# Patient Record
Sex: FEMALE | Race: BLACK OR AFRICAN AMERICAN | NOT HISPANIC OR LATINO | Employment: OTHER | URBAN - METROPOLITAN AREA
[De-identification: names, ages, dates, MRNs, and addresses within clinical notes are randomized per-mention and may not be internally consistent; named-entity substitution may affect disease eponyms.]

---

## 2017-04-05 ENCOUNTER — HOSPITAL ENCOUNTER (EMERGENCY)
Facility: OTHER | Age: 57
Discharge: HOME | End: 2017-04-05
Attending: EMERGENCY MEDICINE
Payer: MEDICAID

## 2017-04-05 VITALS
BODY MASS INDEX: 44.16 KG/M2 | RESPIRATION RATE: 18 BRPM | TEMPERATURE: 98 F | HEIGHT: 62 IN | OXYGEN SATURATION: 97 % | DIASTOLIC BLOOD PRESSURE: 79 MMHG | WEIGHT: 240 LBS | SYSTOLIC BLOOD PRESSURE: 131 MMHG | HEART RATE: 80 BPM

## 2017-04-05 DIAGNOSIS — H66.001 ACUTE SUPPURATIVE OTITIS MEDIA OF RIGHT EAR WITHOUT SPONTANEOUS RUPTURE OF TYMPANIC MEMBRANE, RECURRENCE NOT SPECIFIED: Primary | ICD-10-CM

## 2017-04-05 PROCEDURE — 99283 EMERGENCY DEPT VISIT LOW MDM: CPT

## 2017-04-05 RX ORDER — AMOXICILLIN AND CLAVULANATE POTASSIUM 875; 125 MG/1; MG/1
1 TABLET, FILM COATED ORAL 2 TIMES DAILY
Qty: 20 TABLET | Refills: 0 | Status: SHIPPED | OUTPATIENT
Start: 2017-04-05 | End: 2017-04-15

## 2017-04-05 RX ORDER — OXYCODONE AND ACETAMINOPHEN 5; 325 MG/1; MG/1
1 TABLET ORAL
Status: DISCONTINUED | OUTPATIENT
Start: 2017-04-05 | End: 2017-04-05

## 2017-04-05 RX ORDER — NAPROXEN 500 MG/1
500 TABLET ORAL 2 TIMES DAILY WITH MEALS
Qty: 20 TABLET | Refills: 0 | Status: SHIPPED | OUTPATIENT
Start: 2017-04-05 | End: 2021-11-21

## 2017-04-05 NOTE — DISCHARGE INSTRUCTIONS
Understanding Middle Ear Infections in Children  Middle ear infections are most common in children under age 5. Crankiness, a fever, and tugging at or rubbing the ear may all be signs that your child has a middle ear infection. This is especially true if your child has a cold or other viral illness. It's important to call your healthcare provider if you see these or any of the signs listed below.  Call your healthcare provider's office if you notice any signs of a middle ear infection.   What are middle ear infections?    Middle ear infections occur behind the eardrum. The eardrum is the thin sheet of tissue that passes sound waves between the outer and middle ear. These infections are usually caused by bacteria or viruses. These are often related to a recent cold or allergy problem.  A blocked tube  In young children, these bacteria or viruses likely reach the middle ear by traveling the short length of the eustachian tube from the back of the nose. Once in the middle ear, they multiply and spread. This irritates delicate tissues lining the middle ear and eustachian tube. If the tube lining swells enough to block off the tube, air pressure drops in the middle ear. This pulls the eardrum inward, making it stiffer and less able to transmit sound.  Fluid buildup causes pain  Once the eustachian tube swells shut, moisture cant drain from the middle ear. Fluid that should flush out the infection builds up in the chamber. This may raise pressure behind the eardrum. This can decrease pain slightly. But if the infection spreads to this fluid, pressure behind the eardrum goes way up. The eardrum is forced outward. It becomes painful, and may break.  Chronic fluid affects hearing  If the eardrum doesnt break and the tube remains blocked, the fluid becomes an ongoing condition (chronic). As the immediate (acute) infection passes, the middle ear fluid thickens. It becomes sticky and takes up less space. Pressure drops in  the middle ear once more. Inward suction stiffens the eardrum. This affects hearing. If the fluid is not removed, the eardrum may be stretched and damaged.  Signs of middle ear problems  · A temperature over 100.4°F (38.0°C) and cold symptoms  · Severe ear pain  · Any kind of discharge from the ear  · Ear pain that gets worse or doesnt go away after a few days   When to call your child's healthcare provider  Call your child's healthcare provider's office if your otherwise healthy child has any of the signs or symptoms described below:  · In an infant under 3 months old, a rectal temperature of 100.4°F (38.0°C) or higher  · In a child of any age who has a repeated temperature of 104°F (40°C) or higher  · A fever that lasts more than 24 hours in a child under 2 years old, or for 3 days in a child 2 years or older  · Your child has had a seizure caused by the fever  · Rapid breathing or shortness of breath  · A stiff neck or headache  · Difficulty swallowing  · Your child acts ill after the fever is gone  · Persistent brown, green, or bloody mucus  · Signs of dehydration. These include severe thirst, dark yellow urine, infrequent urination, dull or sunken eyes, dry skin, and dry or cracked lips.  · Your child still doesn't look or act right to you, even after taking a non-aspirin pain reliever  Date Last Reviewed: 11/1/2016  © 0621-2162 [employer]. [address] [place] [address], [place] [address]. All rights reserved. This information is not intended as a substitute for professional medical care. Always follow your healthcare professional's instructions.          Reducing the Risk of Middle Ear Infections     Good handwashing can help your child prevent ear infections.   Most children have had at least one middle ear infection by the age of 2. Treatment may depend on whether the problem is acute or chronic, as well as how often it comes back and how long it lasts.   Reducing risk factors  Some behaviors or  surroundings increase your childs risk of ear infection. Reducing such risk factors can be helpful at any point in treatment. The tips below may help.  · If your child goes to group , he or she runs a greater risk of getting colds or flu, which may then lead to an ear infection. Help prevent these illnesses by teaching your child to wash his or her hands often.  · If your child has nasal allergies, do your best to control dust, mold, mildew, and pet hair in the house. Also stop or greatly limit your childs contact with secondhand smoke.  · If food allergies are a problem, identify the food that triggers the reaction and help your child avoid it.  Watching and waiting  · If your child is diagnosed with an ear infection, the doctor may prescribe antibiotics and will suggest a period of watchful waiting. This means not filling any prescriptions right away. Instead of antibiotics, a trial of medications to relieve symptoms including those for pain or fever, is advised, along with waiting some time to see if a child improves without antibiotic therapy. Whether or not your doctor prescribes immediate antibiotics or a period of watchful waiting depends on your child's age and risk factors.  · During this time, your child should be monitored to see if his or her symptoms are improving and to make sure new symptoms, such as fever or vomiting, don't develop. If a child doesn't improve within a few days or develops new symptoms, antibiotics will usually be started.    Date Last Reviewed: 9/3/2014  © 5000-9934 [employer]. [address] [place] [address], [place] [address]. All rights reserved. This information is not intended as a substitute for professional medical care. Always follow your healthcare professional's instructions.          When to Use Antibiotics   Antibiotics are medicines used to treat infections caused by bacteria. They dont work for illnesses caused by viruses or an allergic reaction. In  fact, taking antibiotics for reasons other than a bacterial infection can cause problems. For example, you may have side effects from the medicine. And if you really need an antibiotic, it may not work well.                                                                                                                                              When antibiotics wont help  Your healthcare provider wont usually prescribe antibiotics for the following conditions. You can help by not asking for them if you have:   · A cold. This type of illness is caused by a virus. It can cause a runny nose, stuffed-up nose, sneezing, coughing, headache, mild body aches, and low fever. A cold gets better on its own in a few days to a week.  · The flu (influenza). This is a respiratory illness caused by a virus. The flu usually goes away on its own in a week or so. It can cause fever, body aches, sore throat, and fatigue.  · Bronchitis. This is an infection in the lungs most often caused by a virus. You may have coughing, phlegm, body aches, and a low fever. A common type of bronchitis is known as a chest cold (acute bronchitis). This often happens after you have a respiratory infection like a common cold. Bronchitis can take weeks to go away, but antibiotics usually dont help.  · Most sore throats. Sore throats are most often caused by viruses. Your throat may feel scratchy or achy, and it may hurt to swallow. You may also have a low fever and body aches. A sore throat usually gets better in a few days.  · Most ear infections. An ear infection may be caused by a virus or bacteria. It causes pain in the ear. Antibiotics usually dont help, and the infection goes away on its own.  · Most sinus infections (sinusitis). This kind of infection causes sinus pain and swelling, and a runny nose. In most cases, sinusitis goes away on its own, and antibiotics dont make recovery quicker.  · Allergic rhinitis. This is a set of symptoms caused  by an allergic reaction. You may have sneezing, a runny nose, itchy or watery eyes, or a sore throat. Allergies are not treated with antibiotics.  · Low fever. A mild fever thats less than 100.4°F (38°C) most likely doesnt need treatment with antibiotics.   When antibiotics can help   Antibiotics can be used to treat:                                                     · Strep throat. This is a throat infectioncaused by a certain type of bacteria. Symptoms of strep throat include a sore throat, white patches on the tonsils, red spots on the roof of the mouth, fever, body aches, and nausea and vomiting.  · Urinary tract infection (UTI). This is a bacterial infection of the bladder and the tube that takes urine out of the body. It can cause burning pain and urine thats cloudy or tinted with blood. UTIs are very common. Antibiotics usually help treat these infections.  · Some ear infections. In some cases, a healthcare provider may prescribe antibiotics for an ear infection. You may need a test to show whats causing the ear infection.  · Some sinus infections. In some cases, yourhealthcare provider may give you antibiotics. He or she may first need to make sure your symptoms arent caused by a virus, fungus, allergies, or air pollutants such as smoke.   Your doctor may also recommend antibiotics if you have a condition that can affect your immune system, such as diabetes or cancer.   Self-care at home   If your infection cant be treated with antibiotics, you can take other steps to feel better. Try the remedies below. In general:   · Rest and sleep as much as needed.  · Drink water and other clear fluids.  · Dont smoke, and avoid smoke from other people.  · Use over-the-counter medicine such as acetaminophen to ease pain or fever, as directed by your healthcare provider.   To treat sinus pain or nasal congestion:   · Put a warm, moist washcloth on your face where you feel sinus pain or pressure.  · Use a nasal  spray with medicine or saline, as directed by your healthcare provider.  · Breathe in steam from a hot shower.  · Use a humidifier or cool mist vaporizer.   To quiet a cough:   · Use a humidifier or cool mist vaporizer.  · Breathe in steam from a hot shower.  · Use cough lozenges.   To sooth a sore throat:   · Suck on ice chips, popsicles, or lozenges.  · Use a sore throat spray.  · Use a humidifier or cool mist vaporizer.  · Gargle with saltwater.  · Drink warm liquids.   To ease ear pain:   · Hold a warm, moist washcloth on the ear for 10 minutes at a time.  Date Last Reviewed: 9/1/2016  © 8913-0034 [employer]. [address] [place] [address], [place] [address]. All rights reserved. This information is not intended as a substitute for professional medical care. Always follow your healthcare professional's instructions.

## 2017-04-05 NOTE — ED PROVIDER NOTES
Encounter Date: 4/5/2017       History     Chief Complaint   Patient presents with    Ear Fullness     Pt here with c/o pain/fullness to right ear     Review of patient's allergies indicates:   Allergen Reactions    Codeine Itching     HPI Comments: Irasema Valverde is a 56 y.o. female who presents to the Emergency Department with  right ear pain also left ear feels full.  Patient states her symptoms started a few days ago and are getting worse every day.    Patient is a 56 y.o. female presenting with the following complaint: ear pain. The history is provided by the patient.   Otalgia   This is a new problem. The current episode started several days ago. There is pain in the right ear. The problem occurs constantly. The problem has been gradually worsening. Pertinent negatives include no headaches, no rhinorrhea, no sore throat, no abdominal pain, no vomiting, no neck pain, no cough and no rash.     Past Medical History:   Diagnosis Date    Anemia     Arthritis     Asthma     CHF (congestive heart failure)     Ear infection     chronic    Glossopharyngeal neuralgia     High cholesterol     Hypertension     Hypothyroid     Lung disease     Migraine headache     Thyroid disease     Tracheostomy in place     Umbilical hernia     Vertigo      Past Surgical History:   Procedure Laterality Date    HERNIA REPAIR      HYSTERECTOMY      Right chronic otitiis media with mucocle      THYROID SURGERY      TRACHEAL SURGERY       Family History   Problem Relation Age of Onset    Hypertension Mother     Hyperlipidemia Mother     Hypertension Brother     Hyperlipidemia Brother     Diabetes Brother      Social History   Substance Use Topics    Smoking status: Never Smoker    Smokeless tobacco: None    Alcohol use Yes      Comment: occasional     Review of Systems   Constitutional: Negative for chills and fever.   HENT: Positive for ear pain. Negative for rhinorrhea and sore throat.    Respiratory: Negative  for cough and shortness of breath.    Cardiovascular: Negative for chest pain.   Gastrointestinal: Negative for abdominal pain and vomiting.   Musculoskeletal: Negative for neck pain.   Skin: Negative for rash.   Neurological: Negative for headaches.   All other systems reviewed and are negative.      Physical Exam   Initial Vitals   BP Pulse Resp Temp SpO2   04/05/17 1538 04/05/17 1538 04/05/17 1538 04/05/17 1538 04/05/17 1538   131/79 80 18 98.3 °F (36.8 °C) 97 %     Physical Exam    Nursing note and vitals reviewed.  Constitutional: She appears well-developed and well-nourished. No distress.   HENT:   Head: Normocephalic and atraumatic.   Right Ear: External ear normal. No swelling. No mastoid tenderness. Tympanic membrane is erythematous.   Left Ear: Tympanic membrane and external ear normal. No swelling. No mastoid tenderness.   Nose: Nose normal.   Mouth/Throat: Oropharynx is clear and moist.   Right ear minimal amount of earwax tympanic membrane with erythema.  No discharge per sheeted.  Left ear moderate amount of earwax tympanic membrane normal.   Eyes: EOM are normal. Pupils are equal, round, and reactive to light. No scleral icterus.   Neck: Normal range of motion. Neck supple.   Cardiovascular: Normal rate, regular rhythm, normal heart sounds and intact distal pulses.   Pulmonary/Chest: Breath sounds normal. No respiratory distress. She has no wheezes. She has no rales.   Abdominal: Soft. Bowel sounds are normal. She exhibits no distension. There is no tenderness. There is no rebound.   Musculoskeletal: Normal range of motion. She exhibits no edema or tenderness.   Neurological: She is alert and oriented to person, place, and time. She has normal strength.   Skin: Skin is warm and dry.         ED Course   Procedures  Labs Reviewed - No data to display                            ED Course     CC ear pain and fullness  DDx viral illness, otitis media, otitis externa, and cerumen impacted  Treatment in the  ED physical exam, offered patient Percocet for pain but she declined it.  Patient reports she feels that she has a lot of earwax.  Minimal air wax the right ear moderate amount of earwax in left ear.  Discussed outpatient to plan and need to follow-up with primary care.    Fill and take prescriptions for Augmentin, naproxen, and Debrox as directed.  Answered questions and discussed discharge plan.    Follow up with PCP in 1-2 days.    Clinical Impression:   The encounter diagnosis was Acute suppurative otitis media of right ear without spontaneous rupture of tympanic membrane, recurrence not specified.          Lianna Brown DO  04/05/17 1485

## 2017-04-05 NOTE — ED AVS SNAPSHOT
[facility] EMERGENCY DEPARTMENT  [address] [facility] [place] [address]               Irasema Valverde   2017  3:56 PM   ED    Description:  Female : 1960   Department:  [facility] Emergency Department           Your Care was Coordinated By:     Provider Role From To    Lianna Brown DO Attending Provider [id number] --      Reason for Visit     Ear Fullness           Diagnoses this Visit        Comments    Acute suppurative otitis media of right ear without spontaneous rupture of tympanic membrane, recurrence not specified    -  Primary       ED Disposition     ED Disposition Condition Comment    Discharge             To Do List           Follow-up Information     Follow up with Corry Rao MD. Schedule an appointment as soon as possible for a visit in 1 day.    Specialty:  Internal Medicine    Contact information:    [address]  [place] [address]  [phone]          Follow up with [facility] Emergency Department.    Specialty:  Emergency Medicine    Why:  If symptoms worsen    Contact information:    [address] [facility] [address]  [phone]       These Medications        Disp Refills Start End    amoxicillin-clavulanate 875-125mg (AUGMENTIN) 875-125 mg per tablet 20 tablet 0 2017 4/15/2017    Take 1 tablet by mouth 2 (two) times daily. - Oral    Pharmacy: [facility] [address], [facility] Ph #: [phone]       naproxen (NAPROSYN) 500 MG tablet 20 tablet 0 2017     Take 1 tablet (500 mg total) by mouth 2 (two) times daily with meals. - Oral    Pharmacy: [address], [facility] Ph #: [phone]       carbamide peroxide (DEBROX) 6.5 % otic solution 15 mL 0 2017     Place 5 drops into both ears as needed. - Both Ears    Pharmacy: [address], [facility] Ph #: [phone]         [facility]     [facility]  Nurse Care Line - 24/7 Assistance  Unless otherwise directed by your provider, please contact [facility], our nurse care line that is available for 24/7 assistance.     Registered nurses in the [facility] provide: appointment scheduling, clinical advisement, health education, and other advisory services.  Call: [phone] (toll free)               Medications           Message regarding Medications     Verify the changes and/or additions to your medication regime listed below are the same as discussed with your clinician today.  If any of these changes or additions are incorrect, please notify your healthcare provider.        START taking these NEW medications        Refills    amoxicillin-clavulanate 875-125mg (AUGMENTIN) 875-125 mg per tablet 0    Sig: Take 1 tablet by mouth 2 (two) times daily.    Class: Print    Route: Oral    naproxen (NAPROSYN) 500 MG tablet 0    Sig: Take 1 tablet (500 mg total) by mouth 2 (two) times daily with meals.    Class: Print    Route: Oral    carbamide peroxide (DEBROX) 6.5 % otic solution 0    Sig: Place 5 drops into both ears as needed.    Class: Print    Route: Both Ears      These medications were administered today        Dose Freq    oxycodone-acetaminophen 5-325 mg per tablet 1 tablet 1 tablet ED 1 Time    Sig: Take 1 tablet by mouth ED 1 Time.    Class: Normal    Route: Oral           Verify that the below list of medications is an accurate representation of the medications you are currently taking.  If none reported, the list may be blank. If incorrect, please contact your healthcare provider. Carry this list with you in case of emergency.           Current Medications     acetaminophen (TYLENOL) 325 MG tablet Take 2 tablets (650 mg total) by mouth every 4 to 6 hours as needed for Pain.    albuterol (PROVENTIL) 2.5 mg /3 mL (0.083 %) nebulizer solution Take 2.5 mg by nebulization every 4 (four) hours as needed.      amoxicillin-clavulanate 875-125mg  "(AUGMENTIN) 875-125 mg per tablet Take 1 tablet by mouth 2 (two) times daily.    atorvastatin (LIPITOR) 80 MG tablet Take 80 mg by mouth once daily.      carbamide peroxide (DEBROX) 6.5 % otic solution Place 5 drops into both ears as needed.    carvedilol (COREG) 3.125 MG tablet Take 3.125 mg by mouth 2 (two) times daily with meals.    diazepam (VALIUM) 5 MG tablet Take 5 mg by mouth every 6 (six) hours as needed.    dicyclomine (BENTYL) 20 mg tablet Take 1 tablet (20 mg total) by mouth every 6 (six) hours as needed (every 6 hours as needed for pain).    docusate sodium (COLACE) 100 MG capsule Take 100 mg by mouth 2 (two) times daily.      fluticasone-salmeterol 250-50 mcg/dose (ADVAIR DISKUS) 250-50 mcg/dose diskus inhaler Inhale 1 puff into the lungs 2 (two) times daily.      furosemide (LASIX) 80 MG tablet Take 80 mg by mouth once daily.    levothyroxine (SYNTHROID) 200 MCG tablet Take 200 mcg by mouth once daily.    lisinopril 10 MG tablet Take 10 mg by mouth once daily.    metoprolol succinate (TOPROL-XL) 25 MG 24 hr tablet Take 25 mg by mouth once daily.    naproxen (NAPROSYN) 500 MG tablet Take 1 tablet (500 mg total) by mouth 2 (two) times daily with meals.    neomycin-polymyxin-hydrocortisone (CORTISPORIN) 3.5-10,000-1 mg-unit/mL-% otic suspension Please place 4 drops into the right ear every 6 hours, 4 times a day    oxycodone-acetaminophen 5-325 mg per tablet 1 tablet Take 1 tablet by mouth ED 1 Time.    pantoprazole (PROTONIX) 40 MG tablet Take 1 tablet (40 mg total) by mouth once daily.           Clinical Reference Information           Your Vitals Were     BP Pulse Temp Resp Height Weight    131/79 80 98.3 °F (36.8 °C) (Temporal) 18 5' 2" (1.575 m) 108.9 kg (240 lb)    SpO2 BMI             97% 43.9 kg/m2         Allergies as of 4/5/2017        Reactions    Codeine Itching      Immunizations Administered on Date of Encounter - 4/5/2017     None      ED Micro, Lab, POCT     None      ED Imaging Orders  "    None        Discharge Instructions         Understanding Middle Ear Infections in Children  Middle ear infections are most common in children under age 5. Crankiness, a fever, and tugging at or rubbing the ear may all be signs that your child has a middle ear infection. This is especially true if your child has a cold or other viral illness. It's important to call your healthcare provider if you see these or any of the signs listed below.  Call your healthcare provider's office if you notice any signs of a middle ear infection.   What are middle ear infections?    Middle ear infections occur behind the eardrum. The eardrum is the thin sheet of tissue that passes sound waves between the outer and middle ear. These infections are usually caused by bacteria or viruses. These are often related to a recent cold or allergy problem.  A blocked tube  In young children, these bacteria or viruses likely reach the middle ear by traveling the short length of the eustachian tube from the back of the nose. Once in the middle ear, they multiply and spread. This irritates delicate tissues lining the middle ear and eustachian tube. If the tube lining swells enough to block off the tube, air pressure drops in the middle ear. This pulls the eardrum inward, making it stiffer and less able to transmit sound.  Fluid buildup causes pain  Once the eustachian tube swells shut, moisture cant drain from the middle ear. Fluid that should flush out the infection builds up in the chamber. This may raise pressure behind the eardrum. This can decrease pain slightly. But if the infection spreads to this fluid, pressure behind the eardrum goes way up. The eardrum is forced outward. It becomes painful, and may break.  Chronic fluid affects hearing  If the eardrum doesnt break and the tube remains blocked, the fluid becomes an ongoing condition (chronic). As the immediate (acute) infection passes, the middle ear fluid thickens. It becomes sticky  and takes up less space. Pressure drops in the middle ear once more. Inward suction stiffens the eardrum. This affects hearing. If the fluid is not removed, the eardrum may be stretched and damaged.  Signs of middle ear problems  · A temperature over 100.4°F (38.0°C) and cold symptoms  · Severe ear pain  · Any kind of discharge from the ear  · Ear pain that gets worse or doesnt go away after a few days   When to call your child's healthcare provider  Call your child's healthcare provider's office if your otherwise healthy child has any of the signs or symptoms described below:  · In an infant under 3 months old, a rectal temperature of 100.4°F (38.0°C) or higher  · In a child of any age who has a repeated temperature of 104°F (40°C) or higher  · A fever that lasts more than 24 hours in a child under 2 years old, or for 3 days in a child 2 years or older  · Your child has had a seizure caused by the fever  · Rapid breathing or shortness of breath  · A stiff neck or headache  · Difficulty swallowing  · Your child acts ill after the fever is gone  · Persistent brown, green, or bloody mucus  · Signs of dehydration. These include severe thirst, dark yellow urine, infrequent urination, dull or sunken eyes, dry skin, and dry or cracked lips.  · Your child still doesn't look or act right to you, even after taking a non-aspirin pain reliever  Date Last Reviewed: 11/1/2016  © 8633-2273 [employer]. [address] [place] [address], [place] [address]. All rights reserved. This information is not intended as a substitute for professional medical care. Always follow your healthcare professional's instructions.          Reducing the Risk of Middle Ear Infections     Good handwashing can help your child prevent ear infections.   Most children have had at least one middle ear infection by the age of 2. Treatment may depend on whether the problem is acute or chronic, as well as how often it comes back and how long it lasts.    Reducing risk factors  Some behaviors or surroundings increase your childs risk of ear infection. Reducing such risk factors can be helpful at any point in treatment. The tips below may help.  · If your child goes to group , he or she runs a greater risk of getting colds or flu, which may then lead to an ear infection. Help prevent these illnesses by teaching your child to wash his or her hands often.  · If your child has nasal allergies, do your best to control dust, mold, mildew, and pet hair in the house. Also stop or greatly limit your childs contact with secondhand smoke.  · If food allergies are a problem, identify the food that triggers the reaction and help your child avoid it.  Watching and waiting  · If your child is diagnosed with an ear infection, the doctor may prescribe antibiotics and will suggest a period of watchful waiting. This means not filling any prescriptions right away. Instead of antibiotics, a trial of medications to relieve symptoms including those for pain or fever, is advised, along with waiting some time to see if a child improves without antibiotic therapy. Whether or not your doctor prescribes immediate antibiotics or a period of watchful waiting depends on your child's age and risk factors.  · During this time, your child should be monitored to see if his or her symptoms are improving and to make sure new symptoms, such as fever or vomiting, don't develop. If a child doesn't improve within a few days or develops new symptoms, antibiotics will usually be started.    Date Last Reviewed: 9/3/2014  © 8069-8890 [employer]. [address] [place] [address], [place] [address]. All rights reserved. This information is not intended as a substitute for professional medical care. Always follow your healthcare professional's instructions.          When to Use Antibiotics   Antibiotics are medicines used to treat infections caused by bacteria. They dont work for illnesses  caused by viruses or an allergic reaction. In fact, taking antibiotics for reasons other than a bacterial infection can cause problems. For example, you may have side effects from the medicine. And if you really need an antibiotic, it may not work well.                                                                                                                                              When antibiotics wont help  Your healthcare provider wont usually prescribe antibiotics for the following conditions. You can help by not asking for them if you have:   · A cold. This type of illness is caused by a virus. It can cause a runny nose, stuffed-up nose, sneezing, coughing, headache, mild body aches, and low fever. A cold gets better on its own in a few days to a week.  · The flu (influenza). This is a respiratory illness caused by a virus. The flu usually goes away on its own in a week or so. It can cause fever, body aches, sore throat, and fatigue.  · Bronchitis. This is an infection in the lungs most often caused by a virus. You may have coughing, phlegm, body aches, and a low fever. A common type of bronchitis is known as a chest cold (acute bronchitis). This often happens after you have a respiratory infection like a common cold. Bronchitis can take weeks to go away, but antibiotics usually dont help.  · Most sore throats. Sore throats are most often caused by viruses. Your throat may feel scratchy or achy, and it may hurt to swallow. You may also have a low fever and body aches. A sore throat usually gets better in a few days.  · Most ear infections. An ear infection may be caused by a virus or bacteria. It causes pain in the ear. Antibiotics usually dont help, and the infection goes away on its own.  · Most sinus infections (sinusitis). This kind of infection causes sinus pain and swelling, and a runny nose. In most cases, sinusitis goes away on its own, and antibiotics dont make recovery  quicker.  · Allergic rhinitis. This is a set of symptoms caused by an allergic reaction. You may have sneezing, a runny nose, itchy or watery eyes, or a sore throat. Allergies are not treated with antibiotics.  · Low fever. A mild fever thats less than 100.4°F (38°C) most likely doesnt need treatment with antibiotics.   When antibiotics can help   Antibiotics can be used to treat:                                                     · Strep throat. This is a throat infectioncaused by a certain type of bacteria. Symptoms of strep throat include a sore throat, white patches on the tonsils, red spots on the roof of the mouth, fever, body aches, and nausea and vomiting.  · Urinary tract infection (UTI). This is a bacterial infection of the bladder and the tube that takes urine out of the body. It can cause burning pain and urine thats cloudy or tinted with blood. UTIs are very common. Antibiotics usually help treat these infections.  · Some ear infections. In some cases, a healthcare provider may prescribe antibiotics for an ear infection. You may need a test to show whats causing the ear infection.  · Some sinus infections. In some cases, yourhealthcare provider may give you antibiotics. He or she may first need to make sure your symptoms arent caused by a virus, fungus, allergies, or air pollutants such as smoke.   Your doctor may also recommend antibiotics if you have a condition that can affect your immune system, such as diabetes or cancer.   Self-care at home   If your infection cant be treated with antibiotics, you can take other steps to feel better. Try the remedies below. In general:   · Rest and sleep as much as needed.  · Drink water and other clear fluids.  · Dont smoke, and avoid smoke from other people.  · Use over-the-counter medicine such as acetaminophen to ease pain or fever, as directed by your healthcare provider.   To treat sinus pain or nasal congestion:   · Put a warm, moist washcloth on  your face where you feel sinus pain or pressure.  · Use a nasal spray with medicine or saline, as directed by your healthcare provider.  · Breathe in steam from a hot shower.  · Use a humidifier or cool mist vaporizer.   To quiet a cough:   · Use a humidifier or cool mist vaporizer.  · Breathe in steam from a hot shower.  · Use cough lozenges.   To sooth a sore throat:   · Suck on ice chips, popsicles, or lozenges.  · Use a sore throat spray.  · Use a humidifier or cool mist vaporizer.  · Gargle with saltwater.  · Drink warm liquids.   To ease ear pain:   · Hold a warm, moist washcloth on the ear for 10 minutes at a time.  Date Last Reviewed: 9/1/2016  © 7010-7102 [employer]. [address] [place] [address]. All rights reserved. This information is not intended as a substitute for professional medical care. Always follow your healthcare professional's instructions.          [facility] Sign-Up     Activating your [facility] account is as easy as 1-2-3!     1) Visit [email or web address], select Sign Up Now, enter this activation code and your date of birth, then select Next.  Activation code not generated  Current Patient Portal Status: Account disabled      2) Create a username and password to use when you visit [facility] in the future and select a security question in case you lose your password and select Next.    3) Enter your e-mail address and click Sign Up!    Additional Information  If you have questions, please e-mail [email or web address] or call [phone] to talk to our [facility] staff. Remember, [facility] is NOT to be used for urgent needs. For medical emergencies, dial 911.          [facility] Emergency Department complies with applicable Federal civil rights laws and does not discriminate on the basis of race, color, national origin, age, disability, or sex.        Language Assistance Services     ATTENTION: Language assistance services are available, free of charge. Please call  [phone].      ATENCIÓN: Si habla español, tiene a taylor disposición servicios gratuitos de asistencia lingüística. Llame al [phone].     CHÚ Ý: N?u b?n nói Ti?ng Vi?t, có các d?ch v? h? tr? ngôn ng? mi?n phí dành cho b?n. G?i s? [phone].

## 2017-04-05 NOTE — ED NOTES
Patient has verified the spelling of their name and  on armband    LOC: The patient is awake, alert, and aware of environment with an appropriate affect, the patient is oriented x 4 and speaking appropriately.     APPEARANCE: Patient resting comfortably and in no acute distress, patient is clean and well groomed, patient's clothing is properly fastened.     RESPIRATORY: Chronic trach patient. Denies any SOB.     CARDIAC:  No chest pain, chest pressure or chest discomfort. No palpitations.     HEENT: Right ear pain. Denies any significant hearing loss.     GASTROINTESTINAL: Soft and non tender to palpation, no distention noted, normoactive bowel sounds present in all four quadrants. No anorexia, nausea, vomiting or diarrhea.     SKIN: The skin is warm and dry, color consistent with ethnicity, patient has normal skin turgor and moist mucus membranes, skin intact, no breakdown or bruising noted.     COMPLAINT: Patient complain of right ear pain x 1 week.

## 2017-09-02 ENCOUNTER — HOSPITAL ENCOUNTER (EMERGENCY)
Facility: HOSPITAL | Age: 57
Discharge: LEFT BEFORE DISCHARGE | End: 2017-09-02
Payer: MEDICAID

## 2017-09-02 VITALS
HEIGHT: 62 IN | DIASTOLIC BLOOD PRESSURE: 81 MMHG | SYSTOLIC BLOOD PRESSURE: 190 MMHG | WEIGHT: 250 LBS | HEART RATE: 97 BPM | TEMPERATURE: 99 F | BODY MASS INDEX: 46.01 KG/M2 | RESPIRATION RATE: 18 BRPM | OXYGEN SATURATION: 98 %

## 2017-09-02 LAB — POCT GLUCOSE: 125 MG/DL (ref 70–110)

## 2017-09-02 PROCEDURE — 99900041 HC LEFT WITHOUT BEING SEEN- EMERGENCY

## 2017-09-02 PROCEDURE — 93005 ELECTROCARDIOGRAM TRACING: CPT

## 2017-09-02 PROCEDURE — 93010 ELECTROCARDIOGRAM REPORT: CPT | Mod: ,,, | Performed by: INTERNAL MEDICINE

## 2017-09-02 PROCEDURE — 82962 GLUCOSE BLOOD TEST: CPT

## 2017-09-30 ENCOUNTER — HOSPITAL ENCOUNTER (EMERGENCY)
Facility: OTHER | Age: 57
Discharge: HOME | End: 2017-09-30
Attending: EMERGENCY MEDICINE
Payer: MEDICAID

## 2017-09-30 VITALS
HEIGHT: 62 IN | TEMPERATURE: 98 F | RESPIRATION RATE: 20 BRPM | OXYGEN SATURATION: 97 % | SYSTOLIC BLOOD PRESSURE: 174 MMHG | WEIGHT: 250 LBS | DIASTOLIC BLOOD PRESSURE: 80 MMHG | BODY MASS INDEX: 46.01 KG/M2 | HEART RATE: 89 BPM

## 2017-09-30 DIAGNOSIS — K42.9 UMBILICAL HERNIA WITHOUT OBSTRUCTION AND WITHOUT GANGRENE: ICD-10-CM

## 2017-09-30 DIAGNOSIS — R52 PAIN: ICD-10-CM

## 2017-09-30 DIAGNOSIS — R31.9 URINARY TRACT INFECTION WITH HEMATURIA, SITE UNSPECIFIED: Primary | ICD-10-CM

## 2017-09-30 DIAGNOSIS — N39.0 URINARY TRACT INFECTION WITH HEMATURIA, SITE UNSPECIFIED: Primary | ICD-10-CM

## 2017-09-30 LAB
ALBUMIN SERPL-MCNC: 3.3 G/DL (ref 3.3–5.5)
ALP SERPL-CCNC: 68 U/L (ref 42–141)
BILIRUB SERPL-MCNC: 0.3 MG/DL (ref 0.2–1.6)
BILIRUBIN, POC UA: NEGATIVE
BLOOD, POC UA: ABNORMAL
BUN SERPL-MCNC: 12 MG/DL (ref 7–22)
CALCIUM SERPL-MCNC: 8.7 MG/DL (ref 8–10.3)
CHLORIDE SERPL-SCNC: 104 MMOL/L (ref 98–108)
CLARITY, POC UA: CLEAR
COLOR, POC UA: YELLOW
CREAT SERPL-MCNC: 1.2 MG/DL (ref 0.6–1.2)
GLUCOSE SERPL-MCNC: 135 MG/DL (ref 73–118)
GLUCOSE, POC UA: NEGATIVE
KETONES, POC UA: NEGATIVE
LEUKOCYTE EST, POC UA: ABNORMAL
NITRITE, POC UA: NEGATIVE
PH UR STRIP: 5.5 [PH]
POC ALT (SGPT): 19 U/L (ref 10–47)
POC AST (SGOT): 20 U/L (ref 11–38)
POC TCO2: 28 MMOL/L (ref 18–33)
POCT GLUCOSE: 159 MG/DL (ref 70–110)
POTASSIUM BLD-SCNC: 4.4 MMOL/L (ref 3.6–5.1)
PROTEIN, POC UA: ABNORMAL
PROTEIN, POC: 7.7 G/DL (ref 6.4–8.1)
SODIUM BLD-SCNC: 149 MMOL/L (ref 128–145)
SPECIFIC GRAVITY, POC UA: 1.02
UROBILINOGEN, POC UA: 1 E.U./DL

## 2017-09-30 PROCEDURE — 99284 EMERGENCY DEPT VISIT MOD MDM: CPT

## 2017-09-30 PROCEDURE — 85025 COMPLETE CBC W/AUTO DIFF WBC: CPT

## 2017-09-30 PROCEDURE — 80053 COMPREHEN METABOLIC PANEL: CPT

## 2017-09-30 PROCEDURE — 81003 URINALYSIS AUTO W/O SCOPE: CPT

## 2017-09-30 RX ORDER — NITROFURANTOIN 25; 75 MG/1; MG/1
100 CAPSULE ORAL 2 TIMES DAILY
Qty: 14 CAPSULE | Refills: 0 | Status: SHIPPED | OUTPATIENT
Start: 2017-09-30 | End: 2017-10-07

## 2017-09-30 NOTE — ED TRIAGE NOTES
Reports left flank pain radiating to LLQ abd x3 days. Denies dysuria/hematuria. Denies trauma/injury. No relief with tylenol.

## 2017-09-30 NOTE — ED PROVIDER NOTES
Encounter Date: 9/30/2017       History     Chief Complaint   Patient presents with    Abdominal Pain    Flank Pain     The history is provided by the patient.   Abdominal Pain   The current episode started several days ago. The onset of the illness was abrupt. Progression since onset: Waxing and waning. The abdominal pain is located in the left flank. The abdominal pain radiates to the back. The severity of the abdominal pain is 8/10. The abdominal pain is relieved by nothing. The other symptoms of the illness do not include fever, fatigue, jaundice, melena, nausea, vomiting, diarrhea, dysuria, hematemesis, hematochezia, vaginal discharge or vaginal bleeding.   The patient states that she believes she is currently not pregnant. The patient has not had a change in bowel habit. Additional symptoms associated with the illness include back pain. Symptoms associated with the illness do not include chills, anorexia, diaphoresis, heartburn, constipation, urgency, hematuria or frequency.     Review of patient's allergies indicates:   Allergen Reactions    Codeine Itching     Past Medical History:   Diagnosis Date    Anemia     Arthritis     Asthma     CHF (congestive heart failure)     Ear infection     chronic    Glossopharyngeal neuralgia     High cholesterol     Hypertension     Hypothyroid     Lung disease     Migraine headache     Thyroid disease     Tracheostomy in place     Umbilical hernia     Vertigo      Past Surgical History:   Procedure Laterality Date    HERNIA REPAIR      HYSTERECTOMY      Right chronic otitiis media with mucocle      THYROID SURGERY      TRACHEAL SURGERY       Family History   Problem Relation Age of Onset    Hypertension Mother     Hyperlipidemia Mother     Hypertension Brother     Hyperlipidemia Brother     Diabetes Brother      Social History   Substance Use Topics    Smoking status: Never Smoker    Smokeless tobacco: Not on file    Alcohol use Yes       Comment: occasional     Review of Systems   Constitutional: Negative.  Negative for chills, diaphoresis, fatigue and fever.   HENT: Negative.    Eyes: Negative.    Respiratory: Negative.    Cardiovascular: Negative.    Gastrointestinal: Positive for abdominal pain. Negative for anorexia, constipation, diarrhea, heartburn, hematemesis, hematochezia, jaundice, melena, nausea and vomiting.   Endocrine: Negative.    Genitourinary: Negative.  Negative for dysuria, frequency, hematuria, urgency, vaginal bleeding and vaginal discharge.   Musculoskeletal: Positive for back pain.   Skin: Negative.    Allergic/Immunologic: Negative.    Neurological: Negative.    Hematological: Negative.    Psychiatric/Behavioral: Negative.    All other systems reviewed and are negative.      Physical Exam     Initial Vitals [09/30/17 1733]   BP Pulse Resp Temp SpO2   (!) 174/80 89 20 98.2 °F (36.8 °C) 97 %      MAP       111.33         Physical Exam    Nursing note and vitals reviewed.  Constitutional: Vital signs are normal. She appears well-developed. She is active and cooperative.   HENT:   Head: Normocephalic and atraumatic.   Eyes: Conjunctivae, EOM and lids are normal. Pupils are equal, round, and reactive to light.   Neck: Trachea normal and full passive range of motion without pain. Neck supple. No thyroid mass present.       Cardiovascular: Normal rate, regular rhythm, S1 normal, S2 normal, normal heart sounds, intact distal pulses and normal pulses.   Abdominal: Soft. Normal appearance, normal aorta and bowel sounds are normal. There is no hepatosplenomegaly. There is no tenderness. There is no rigidity, no rebound, no guarding, no CVA tenderness, no tenderness at McBurney's point and negative Christian's sign.       Musculoskeletal: Normal range of motion.   Lymphadenopathy:     She has no axillary adenopathy.   Neurological: She is alert and oriented to person, place, and time.   Skin: Skin is warm, dry and intact.   Psychiatric: She  has a normal mood and affect. Her speech is normal and behavior is normal. Judgment and thought content normal. Cognition and memory are normal.         ED Course   Procedures  Labs Reviewed   POCT GLUCOSE - Abnormal; Notable for the following:        Result Value    POCT Glucose 159 (*)     All other components within normal limits   POCT URINALYSIS W/O SCOPE   POCT GLUCOSE   POCT CBC   POCT URINALYSIS DIPSTICK (CULTURE IF INDICATED)   POCT CMP              Results for orders placed or performed during the hospital encounter of 09/30/17   POCT URINALYSIS W/O SCOPE   Result Value Ref Range    Glucose, UA Negative (NG)     Bilirubin, UA Negative (NG)     Ketones, UA Negative (NG)     Spec Grav UA 1.025     Blood, UA Trace-intact (A)     PH, UA 5.5     Protein, UA 2+ (A)     Urobilinogen, UA 1.0 E.U./dL    Nitrite, UA Negative (NG)     Leukocytes, UA 1+ (A)     Color, UA Yellow     Clarity, UA Clear    POCT glucose   Result Value Ref Range    POCT Glucose 159 (H) 70 - 110 mg/dL   POCT CMP   Result Value Ref Range    Albumin, POC 3.3 3.3 - 5.5 g/dL    Alkaline Phosphatase, POC 68 42 - 141 U/L    ALT (SGPT), POC 19 10 - 47 U/L    AST (SGOT), POC 20 11 - 38 U/L    POC BUN 12 7 - 22 mg/dL    Calcium, POC 8.7 8.0 - 10.3 mg/dL    POC Chloride 104 98 - 108 mmol/L    POC Creatinine 1.2 0.6 - 1.2 mg/dL    POC Glucose 135 (H) 73 - 118 mg/dL    POC Potassium 4.4 3.6 - 5.1 mmol/L    POC Sodium 149 (H) 128 - 145 mmol/L    Bilirubin 0.3 0.2 - 1.6 mg/dL    POC TCO2 28 18 - 33 mmol/L    Protein 7.7 6.4 - 8.1 g/dL       Medical Decision Making:   ED Management:  The patient has signs and symptoms consistent with renal colic.  In the absence of any kidney stones or about the possibility of urinary tract infection.  I will culture the patient's urine and start her on Macrobid.     Imaging Results          CT Renal Stone Study ABD Pelvis WO (Final result)  Result time 09/30/17 18:18:16    Final result by Lilia August MD (09/30/17  18:18:16)                 Impression:      1.  No acute intra-abdominal findings identified.    2.  Hepatomegaly.    3.  Hysterectomy.    4.  Small fat containing periumbilical hernia with chronic inflammatory changes or scarring in the region, unchanged from 2013.        Electronically signed by: FRAN SALEH MD  Date:     09/30/17  Time:    18:18              Narrative:    Clinical indication: 57-year-old female with abdominal pain.    Comparison: CT abdomen and pelvis 6/2013.    Technique: Transaxial images were obtained through the abdomen and pelvis in 5 mm increments without the use of p.o. or IV contrast.    Findings:  The visualized portion of the heart is unremarkable.  Atelectatic changes are seen the lung bases.    Liver is enlarged measuring 26 cm.  There is no intra-or extrahepatic biliary ductal dilatation.  Gallbladder is contracted.  There is small hiatal hernia.  Stomach is otherwise unremarkable.  Spleen, pancreas, and adrenal glands show no significant.    Kidneys show no evidence of stones or hydronephrosis.  Ureters are unremarkable along their courses.  Urinary bladder is nondistended.  Uterus has been removed.    Appendix is visualized and is unremarkable.  The visualized loops of small and large bowel show no evidence of obstruction or inflammation.  No free air or free fluid.    Aorta tapers normally with moderate atherosclerosis.     Osseous structures are unremarkable.  There is stable small fat containing periumbilical hernia with chronic inflammatory changes or scarring seen within the subcutaneous tissues in this region.  This is unchanged from prior CT from 6/2013.                                             ED Course      Clinical Impression:   The primary encounter diagnosis was Urinary tract infection with hematuria, site unspecified. Diagnoses of Pain and Umbilical hernia without obstruction and without gangrene were also pertinent to this visit.                           Partha  FRED Marks MD  09/30/17 0048

## 2017-12-02 ENCOUNTER — HOSPITAL ENCOUNTER (EMERGENCY)
Facility: OTHER | Age: 57
Discharge: HOME | End: 2017-12-02
Attending: EMERGENCY MEDICINE
Payer: MEDICAID

## 2017-12-02 VITALS
DIASTOLIC BLOOD PRESSURE: 88 MMHG | TEMPERATURE: 99 F | WEIGHT: 250 LBS | HEIGHT: 62 IN | OXYGEN SATURATION: 97 % | BODY MASS INDEX: 46.01 KG/M2 | HEART RATE: 83 BPM | RESPIRATION RATE: 20 BRPM | SYSTOLIC BLOOD PRESSURE: 169 MMHG

## 2017-12-02 DIAGNOSIS — R06.02 SHORTNESS OF BREATH: ICD-10-CM

## 2017-12-02 DIAGNOSIS — I50.9 CONGESTIVE HEART FAILURE, UNSPECIFIED CONGESTIVE HEART FAILURE CHRONICITY, UNSPECIFIED CONGESTIVE HEART FAILURE TYPE: ICD-10-CM

## 2017-12-02 DIAGNOSIS — J45.909 ASTHMA, UNSPECIFIED ASTHMA SEVERITY, UNSPECIFIED WHETHER COMPLICATED, UNSPECIFIED WHETHER PERSISTENT: Primary | ICD-10-CM

## 2017-12-02 DIAGNOSIS — N30.01 ACUTE CYSTITIS WITH HEMATURIA: ICD-10-CM

## 2017-12-02 LAB
ALBUMIN SERPL-MCNC: 3.2 G/DL (ref 3.3–5.5)
ALP SERPL-CCNC: 80 U/L (ref 42–141)
BILIRUB SERPL-MCNC: 0.6 MG/DL (ref 0.2–1.6)
BILIRUBIN, POC UA: NEGATIVE
BLOOD, POC UA: ABNORMAL
BUN SERPL-MCNC: 14 MG/DL (ref 7–22)
CALCIUM SERPL-MCNC: 8.6 MG/DL (ref 8–10.3)
CHLORIDE SERPL-SCNC: 99 MMOL/L (ref 98–108)
CLARITY, POC UA: CLEAR
COLOR, POC UA: YELLOW
CREAT SERPL-MCNC: 0.9 MG/DL (ref 0.6–1.2)
GLUCOSE SERPL-MCNC: 176 MG/DL (ref 73–118)
GLUCOSE, POC UA: NEGATIVE
KETONES, POC UA: NEGATIVE
LEUKOCYTE EST, POC UA: ABNORMAL
NITRITE, POC UA: NEGATIVE
PH UR STRIP: 6 [PH]
POC ALT (SGPT): 22 U/L (ref 10–47)
POC AST (SGOT): 20 U/L (ref 11–38)
POC B-TYPE NATRIURETIC PEPTIDE: 172 PG/ML (ref 0–100)
POC CARDIAC TROPONIN I: 0.03 NG/ML
POC PTINR: 1.1 (ref 0.9–1.2)
POC PTWBT: 12.7 SEC (ref 9.7–14.3)
POC TCO2: 27 MMOL/L (ref 18–33)
POCT GLUCOSE: 206 MG/DL (ref 70–110)
POTASSIUM BLD-SCNC: 4 MMOL/L (ref 3.6–5.1)
PROTEIN, POC UA: ABNORMAL
PROTEIN, POC: 7.6 G/DL (ref 6.4–8.1)
SAMPLE: NORMAL
SAMPLE: NORMAL
SODIUM BLD-SCNC: 142 MMOL/L (ref 128–145)
SPECIFIC GRAVITY, POC UA: 1.02
UROBILINOGEN, POC UA: 1 E.U./DL

## 2017-12-02 PROCEDURE — 85025 COMPLETE CBC W/AUTO DIFF WBC: CPT

## 2017-12-02 PROCEDURE — 87088 URINE BACTERIA CULTURE: CPT

## 2017-12-02 PROCEDURE — 80053 COMPREHEN METABOLIC PANEL: CPT

## 2017-12-02 PROCEDURE — 63600175 PHARM REV CODE 636 W HCPCS: Performed by: EMERGENCY MEDICINE

## 2017-12-02 PROCEDURE — 84484 ASSAY OF TROPONIN QUANT: CPT

## 2017-12-02 PROCEDURE — 83880 ASSAY OF NATRIURETIC PEPTIDE: CPT

## 2017-12-02 PROCEDURE — 96365 THER/PROPH/DIAG IV INF INIT: CPT

## 2017-12-02 PROCEDURE — 25000242 PHARM REV CODE 250 ALT 637 W/ HCPCS: Performed by: EMERGENCY MEDICINE

## 2017-12-02 PROCEDURE — 87086 URINE CULTURE/COLONY COUNT: CPT

## 2017-12-02 PROCEDURE — 94640 AIRWAY INHALATION TREATMENT: CPT

## 2017-12-02 PROCEDURE — 85610 PROTHROMBIN TIME: CPT

## 2017-12-02 PROCEDURE — 99284 EMERGENCY DEPT VISIT MOD MDM: CPT | Mod: 25

## 2017-12-02 PROCEDURE — 87147 CULTURE TYPE IMMUNOLOGIC: CPT

## 2017-12-02 PROCEDURE — 94760 N-INVAS EAR/PLS OXIMETRY 1: CPT

## 2017-12-02 PROCEDURE — 82947 ASSAY GLUCOSE BLOOD QUANT: CPT

## 2017-12-02 PROCEDURE — 81003 URINALYSIS AUTO W/O SCOPE: CPT

## 2017-12-02 PROCEDURE — 96375 TX/PRO/DX INJ NEW DRUG ADDON: CPT

## 2017-12-02 RX ORDER — METOPROLOL TARTRATE 25 MG/1
25 TABLET, FILM COATED ORAL
Status: DISCONTINUED | OUTPATIENT
Start: 2017-12-02 | End: 2017-12-02

## 2017-12-02 RX ORDER — FUROSEMIDE 10 MG/ML
40 INJECTION INTRAMUSCULAR; INTRAVENOUS
Status: DISCONTINUED | OUTPATIENT
Start: 2017-12-02 | End: 2017-12-02

## 2017-12-02 RX ORDER — ASPIRIN 325 MG
325 TABLET ORAL
Status: DISCONTINUED | OUTPATIENT
Start: 2017-12-02 | End: 2017-12-02 | Stop reason: HOSPADM

## 2017-12-02 RX ORDER — FUROSEMIDE 10 MG/ML
40 INJECTION INTRAMUSCULAR; INTRAVENOUS
Status: COMPLETED | OUTPATIENT
Start: 2017-12-02 | End: 2017-12-02

## 2017-12-02 RX ORDER — IPRATROPIUM BROMIDE AND ALBUTEROL SULFATE 2.5; .5 MG/3ML; MG/3ML
3 SOLUTION RESPIRATORY (INHALATION) ONCE
Status: COMPLETED | OUTPATIENT
Start: 2017-12-02 | End: 2017-12-02

## 2017-12-02 RX ORDER — LISINOPRIL 10 MG/1
10 TABLET ORAL
Status: DISCONTINUED | OUTPATIENT
Start: 2017-12-02 | End: 2017-12-02

## 2017-12-02 RX ORDER — ALBUTEROL SULFATE 90 UG/1
2 AEROSOL, METERED RESPIRATORY (INHALATION) EVERY 4 HOURS PRN
Qty: 1 INHALER | Refills: 0 | Status: SHIPPED | OUTPATIENT
Start: 2017-12-02 | End: 2022-07-24 | Stop reason: SDUPTHER

## 2017-12-02 RX ORDER — LEVOFLOXACIN 750 MG/1
750 TABLET ORAL DAILY
Qty: 5 TABLET | Refills: 0 | Status: SHIPPED | OUTPATIENT
Start: 2017-12-02 | End: 2017-12-07

## 2017-12-02 RX ADMIN — FUROSEMIDE 40 MG: 10 INJECTION, SOLUTION INTRAMUSCULAR; INTRAVENOUS at 06:12

## 2017-12-02 RX ADMIN — CEFTRIAXONE SODIUM 1 G: 1 INJECTION, POWDER, FOR SOLUTION INTRAMUSCULAR; INTRAVENOUS at 06:12

## 2017-12-02 RX ADMIN — IPRATROPIUM BROMIDE AND ALBUTEROL SULFATE 3 ML: .5; 3 SOLUTION RESPIRATORY (INHALATION) at 06:12

## 2017-12-03 NOTE — ED PROVIDER NOTES
"Encounter Date: 12/2/2017       History     Chief Complaint   Patient presents with    Chest Pain     Pt states, " I have had chest pain for two days, It hurts when I lay down and when I cough."     Irasema Valverde is a 57 y.o. female who presents to the Emergency Department with  chest pain and shortness of breath for 2 days.  Patient states symptoms are worse when she lays flat.      The history is provided by the patient.   Chest Pain   The current episode started several days ago. Duration of episode(s) is 2 days. Chest pain occurs constantly. The chest pain is unchanged. Associated with: laying flat. The quality of the pain is described as pressure-like. The pain does not radiate. Chest pain is worsened by certain positions. Primary symptoms include shortness of breath and wheezing. Pertinent negatives for primary symptoms include no fever and no nausea.   Pertinent negatives for associated symptoms include no weakness.   Her past medical history is significant for CHF.     Review of patient's allergies indicates:   Allergen Reactions    Codeine Itching     Past Medical History:   Diagnosis Date    Anemia     Arthritis     Asthma     CHF (congestive heart failure)     Ear infection     chronic    Glossopharyngeal neuralgia     High cholesterol     Hypertension     Hypothyroid     Lung disease     Migraine headache     Thyroid disease     Tracheostomy in place     Umbilical hernia     Vertigo      Past Surgical History:   Procedure Laterality Date    HERNIA REPAIR      HYSTERECTOMY      Right chronic otitiis media with mucocle      THYROID SURGERY      TRACHEAL SURGERY       Family History   Problem Relation Age of Onset    Hypertension Mother     Hyperlipidemia Mother     Hypertension Brother     Hyperlipidemia Brother     Diabetes Brother      Social History   Substance Use Topics    Smoking status: Never Smoker    Smokeless tobacco: Not on file    Alcohol use Yes      Comment: " occasional     Review of Systems   Constitutional: Negative for fever.   HENT: Negative for sore throat.    Respiratory: Positive for shortness of breath and wheezing.    Cardiovascular: Positive for chest pain.   Gastrointestinal: Negative for nausea.   Genitourinary: Positive for frequency. Negative for dysuria.   Musculoskeletal: Negative for back pain.   Skin: Negative for rash.   Neurological: Negative for weakness.   Hematological: Does not bruise/bleed easily.   All other systems reviewed and are negative.      Physical Exam     Initial Vitals [12/02/17 1724]   BP Pulse Resp Temp SpO2   (!) 203/101 92 18 99.4 °F (37.4 °C) (!) 94 %      MAP       135         Physical Exam    Nursing note and vitals reviewed.  Constitutional: She appears well-developed and well-nourished.   HENT:   Head: Normocephalic and atraumatic.   Right Ear: External ear normal.   Left Ear: External ear normal.   Nose: Nose normal.   Eyes: Conjunctivae and EOM are normal. Pupils are equal, round, and reactive to light. Right eye exhibits no discharge. Left eye exhibits no discharge.   Neck: Normal range of motion. Neck supple. JVD present.   Cardiovascular: Normal rate, regular rhythm, normal heart sounds and intact distal pulses. Exam reveals no gallop and no friction rub.    No murmur heard.  Pulmonary/Chest: No respiratory distress. She has decreased breath sounds in the right lower field and the left lower field. She has wheezes. She has no rhonchi. She has no rales. She exhibits no tenderness.   Abdominal: Soft. Bowel sounds are normal. She exhibits no distension and no mass. There is no tenderness. There is no rebound and no guarding.   Musculoskeletal: Normal range of motion. She exhibits edema. She exhibits no tenderness.   Neurological: She is alert and oriented to person, place, and time. She has normal strength and normal reflexes. She displays normal reflexes. No cranial nerve deficit or sensory deficit.   Skin: Skin is warm  and dry. No rash noted. No pallor.   Psychiatric: She has a normal mood and affect.         ED Course   Procedures  Labs Reviewed   POCT URINALYSIS W/O SCOPE - Abnormal; Notable for the following:        Result Value    Glucose, UA Negative (*)     Bilirubin, UA Negative (*)     Ketones, UA Negative (*)     Blood, UA 2+ (*)     Protein, UA 1+ (*)     Nitrite, UA Negative (*)     Leukocytes, UA 1+ (*)     All other components within normal limits   POCT GLUCOSE - Abnormal; Notable for the following:     POCT Glucose 206 (*)     All other components within normal limits   POCT CMP - Abnormal; Notable for the following:     Albumin, POC 3.2 (*)     POC Glucose 176 (*)     All other components within normal limits   POCT B-TYPE NATRIURETIC PEPTIDE (BNP) - Abnormal; Notable for the following:     POC B-Type Natriuretic Peptide 172 (*)     All other components within normal limits   CULTURE, URINE   TROPONIN ISTAT   POCT CBC   POCT URINALYSIS W/O SCOPE   POCT GLUCOSE   POCT CMP   POCT PROTIME-INR   POCT TROPONIN   POCT B-TYPE NATRIURETIC PEPTIDE (BNP)   ISTAT PROCEDURE            Imaging Results          X-Ray Chest PA And Lateral (Final result)  Result time 12/02/17 18:17:22    Final result by Ronal Toledo MD (12/02/17 18:17:22)                 Impression:       1.  Stable position of tracheostomy tube.    2.  Stable enlargement of the cardiac silhouette with minimal prominence of the pulmonary interstitial markings.  The findings may represent mild pulmonary edema.              Electronically signed by: RONAL TOLEDO MD  Date:     12/02/17  Time:    18:17              Narrative:    Exam: [id number]  12/02/17  17:53:08 IMG36 ([facility]) : XR CHEST PA AND LATERAL    Technique:    Frontal and lateral chest x-ray    Comparison:    05/17/2016    Findings:      There is stable position of tracheostomy tube.  Monitoring EKG leads are present.  Portions of the lung apices are obscured by the patient's chin.    There is stable enlargement  of the cardiac silhouette.  The hemidiaphragms are unremarkable.  There is no evidence of free air beneath the hemidiaphragms.  There are no pleural effusions.  There is no evidence of a pneumothorax.  There is no evidence of pneumomediastinum.  No airspace opacities are present.  There is prominence of the pulmonary interstitial markings.  No focal consolidation is present.  No osseous structures are unremarkable.  The subcutaneous tissues are unremarkable.                                                   ED Course      Clinical Impression:   The primary encounter diagnosis was Asthma, unspecified asthma severity, unspecified whether complicated, unspecified whether persistent. Diagnoses of Shortness of breath, Congestive heart failure, unspecified congestive heart failure chronicity, unspecified congestive heart failure type, and Acute cystitis with hematuria were also pertinent to this visit.                           Lianna Brown,   12/10/17 7943

## 2017-12-04 LAB — BACTERIA UR CULT: NORMAL

## 2018-12-09 ENCOUNTER — HOSPITAL ENCOUNTER (EMERGENCY)
Facility: HOSPITAL | Age: 58
Discharge: HOME | End: 2018-12-09
Attending: EMERGENCY MEDICINE
Payer: MEDICAID

## 2018-12-09 VITALS
BODY MASS INDEX: 46.01 KG/M2 | WEIGHT: 250 LBS | RESPIRATION RATE: 17 BRPM | HEIGHT: 62 IN | HEART RATE: 84 BPM | OXYGEN SATURATION: 96 % | SYSTOLIC BLOOD PRESSURE: 134 MMHG | TEMPERATURE: 99 F | DIASTOLIC BLOOD PRESSURE: 87 MMHG

## 2018-12-09 DIAGNOSIS — R10.33 PERIUMBILICAL ABDOMINAL PAIN: ICD-10-CM

## 2018-12-09 DIAGNOSIS — H60.501 ACUTE OTITIS EXTERNA OF RIGHT EAR, UNSPECIFIED TYPE: Primary | ICD-10-CM

## 2018-12-09 DIAGNOSIS — K59.00 CONSTIPATION, UNSPECIFIED CONSTIPATION TYPE: ICD-10-CM

## 2018-12-09 DIAGNOSIS — J06.9 UPPER RESPIRATORY TRACT INFECTION, UNSPECIFIED TYPE: ICD-10-CM

## 2018-12-09 PROCEDURE — 63600175 PHARM REV CODE 636 W HCPCS: Performed by: EMERGENCY MEDICINE

## 2018-12-09 PROCEDURE — 96372 THER/PROPH/DIAG INJ SC/IM: CPT

## 2018-12-09 PROCEDURE — 99284 EMERGENCY DEPT VISIT MOD MDM: CPT | Mod: 25

## 2018-12-09 RX ORDER — LACTULOSE 10 G/15ML
30 SOLUTION ORAL DAILY
Qty: 1350 ML | Refills: 0 | Status: SHIPPED | OUTPATIENT
Start: 2018-12-09 | End: 2018-12-19

## 2018-12-09 RX ORDER — DICYCLOMINE HYDROCHLORIDE 10 MG/ML
20 INJECTION INTRAMUSCULAR
Status: COMPLETED | OUTPATIENT
Start: 2018-12-09 | End: 2018-12-09

## 2018-12-09 RX ORDER — IBUPROFEN 800 MG/1
800 TABLET ORAL EVERY 6 HOURS PRN
Qty: 20 TABLET | Refills: 0 | Status: SHIPPED | OUTPATIENT
Start: 2018-12-09 | End: 2019-11-11

## 2018-12-09 RX ORDER — NEOMYCIN SULFATE, POLYMYXIN B SULFATE AND HYDROCORTISONE 10; 3.5; 1 MG/ML; MG/ML; [USP'U]/ML
1-2 SUSPENSION/ DROPS AURICULAR (OTIC) 4 TIMES DAILY PRN
Qty: 10 ML | Refills: 0 | Status: SHIPPED | OUTPATIENT
Start: 2018-12-09 | End: 2018-12-19

## 2018-12-09 RX ORDER — BENZONATATE 100 MG/1
100 CAPSULE ORAL 3 TIMES DAILY PRN
Qty: 20 CAPSULE | Refills: 0 | Status: SHIPPED | OUTPATIENT
Start: 2018-12-09 | End: 2018-12-19

## 2018-12-09 RX ORDER — DICYCLOMINE HYDROCHLORIDE 20 MG/1
20 TABLET ORAL EVERY 6 HOURS
Qty: 30 TABLET | Refills: 0 | Status: ON HOLD | OUTPATIENT
Start: 2018-12-09 | End: 2019-02-16

## 2018-12-09 RX ADMIN — DICYCLOMINE HYDROCHLORIDE 20 MG: 10 INJECTION INTRAMUSCULAR at 08:12

## 2018-12-09 NOTE — ED PROVIDER NOTES
"Encounter Date: 12/9/2018    SCRIBE #1 NOTE: I, Lucho Denise, am scribing for, and in the presence of,  Dr. Craven. I have scribed the following portions of the note - Other sections scribed: HPI, ROS, PE.       History     Chief Complaint   Patient presents with    Generalized Body Aches     pt reports she has been having body aches and right ear pain since Friday. pt denies taking any medicine for pain.     Otalgia    Cough     x 3 days. denies taking any medicine for symptoms     This is a 58 y.o. female who presents to the ED with a complaint of right ear pain that began two days ago. Pt describes her pain as stabbing. Pt reports decreased hearing and describes it as "muffled". Palpation worsens her ear pain. Nothing provides relief. Pt reports subjective fever, chills, nasal congestion, sore throat, productive coughing x yesterday, and a headache. Pt reports lower abdominal pain that has been present for years, but worsened two days ago. Pt has mild constipation, but denies nausea, vomiting, diarrhea, dysuria, or hematuria. Her last BM was yesterday. Pt has not taken medication for her symptoms.       The history is provided by the patient.     Review of patient's allergies indicates:   Allergen Reactions    Codeine Itching     Past Medical History:   Diagnosis Date    Anemia     Arthritis     Asthma     CHF (congestive heart failure)     Ear infection     chronic    Glossopharyngeal neuralgia     High cholesterol     Hypertension     Hypothyroid     Lung disease     Migraine headache     Thyroid disease     Tracheostomy in place     Umbilical hernia     Vertigo      Past Surgical History:   Procedure Laterality Date    EXAM UNDER ANESTHESIA Left 4/30/2013    Performed by [facility] at [facility] OR 2ND FLR    HERNIA REPAIR      HYSTERECTOMY      REMOVAL, TYMPANOSTOMY TUBE Right 4/30/2013    Performed by [facility] at [facility] OR 2ND FLR    Right chronic otitiis media " with mucocle      THYROID SURGERY      TRACHEAL SURGERY       Family History   Problem Relation Age of Onset    Hypertension Mother     Hyperlipidemia Mother     Hypertension Brother     Hyperlipidemia Brother     Diabetes Brother      Social History     Tobacco Use    Smoking status: Never Smoker   Substance Use Topics    Alcohol use: Yes     Comment: occasional    Drug use: No     Review of Systems   Constitutional: Positive for chills and fever (  Subjective).   HENT: Positive for congestion, ear pain, hearing loss and sore throat.    Respiratory: Positive for cough.    Gastrointestinal: Positive for abdominal pain and constipation. Negative for diarrhea, nausea and vomiting.   Genitourinary: Negative for dysuria and hematuria.   Neurological: Positive for headaches.       Physical Exam     Initial Vitals [12/09/18 0754]   BP Pulse Resp Temp SpO2   (S) (!) 153/99 90 17 98.5 °F (36.9 °C) 97 %      MAP       --         Physical Exam    Nursing note and vitals reviewed.  Constitutional: She appears well-developed and well-nourished.   HENT:   Right Ear: External ear normal. Tympanic membrane is not erythematous. A middle ear effusion is present.   Left Ear: Tympanic membrane and external ear normal.   Nose: Right sinus exhibits no maxillary sinus tenderness and no frontal sinus tenderness. Left sinus exhibits no maxillary sinus tenderness and no frontal sinus tenderness.   Mouth/Throat: Uvula is midline, oropharynx is clear and moist and mucous membranes are normal. No oropharyngeal exudate, posterior oropharyngeal edema or posterior oropharyngeal erythema.   Post nasal drip noted    Neck: No muscular tenderness present. No tracheal deviation (Pt has a tracheostomy) and no edema present.   Cardiovascular: Normal rate, regular rhythm, normal heart sounds and intact distal pulses. Exam reveals no gallop and no friction rub.    No murmur heard.  Pulmonary/Chest: Breath sounds normal. No respiratory distress.  She has no wheezes. She has no rhonchi. She has no rales. She exhibits no tenderness.   Abdominal: Soft. Bowel sounds are normal. There is no tenderness. There is no rebound and no guarding.       Musculoskeletal: Normal range of motion.   Neurological: She is alert and oriented to person, place, and time.   Skin: Skin is warm and dry. Capillary refill takes less than 2 seconds.   Psychiatric: She has a normal mood and affect. Her behavior is normal.         ED Course   Procedures  Labs Reviewed - No data to display       Imaging Results          X-Ray Abdomen Flat And Erect (Final result)  Result time 12/09/18 08:28:19    Final result by Boby Miller MD (12/09/18 08:28:19)                 Impression:      As above      Electronically signed by: Boby Miller MD  Date:    12/09/2018  Time:    08:28             Narrative:    EXAMINATION:  XR ABDOMEN FLAT AND ERECT    CLINICAL HISTORY:  Periumbilical pain    TECHNIQUE:  Flat and erect AP views of the abdomen were performed.    COMPARISON:  CT abdomen pelvis from September 2017.    FINDINGS:  A few scattered air-filled loops of small bowel are seen.  Stool projects about the rectum.  Bowel gas pattern is nonobstructive.  No free air is identified.  Bones appear to be intact.  No pathologic calcifications are seen.  Please note detail obscured by body habitus.                                 Medical Decision Making:   Initial Assessment:   This is a 58 y.o. female who presents to the ED with a complaint of right ear pain, lower abdominal pain, subjective fever, chills, nasal congestion, sore throat, productive coughing, constipation, and headaches but denies nausea, vomiting, diarrhea, dysuria, or hematuria.     The pt's physical exam is significant for right mid ear effusion.  Patient has a trach  Clinical Tests:   Radiological Study: Ordered  ED Management:  I will order an Xray Abdomen Flat and Erect.    No evidence of obstruction on abdominal film  Patient will be  treated with dicyclomine.    Patient says she feels constipated.  She will be discharged on lactulose and Bentyl.  She will also be treated with Cortisporin otic for the ear infection as well as Motrin.  She will be prescribed Tessalon Perles.            Scribe Attestation:   Scribe #1: I performed the above scribed service and the documentation accurately describes the services I performed. I attest to the accuracy of the note.       I, Dr. Giselle Craven, personally performed the services described in this documentation. All medical record entries made by the scribe were at my direction and in my presence.  I have reviewed the chart and agree that the record reflects my personal performance and is accurate and complete. Giselle Craven MD.  7:34 AM 12/10/2018             Clinical Impression:     1. Acute otitis externa of right ear, unspecified type    2. Periumbilical abdominal pain    3. Constipation, unspecified constipation type                                   Giselle Craven MD  12/10/18 0734

## 2019-02-16 ENCOUNTER — HOSPITAL ENCOUNTER (OUTPATIENT)
Facility: HOSPITAL | Age: 59
Discharge: HOME HEALTH | End: 2019-02-17
Attending: EMERGENCY MEDICINE | Admitting: EMERGENCY MEDICINE
Payer: MEDICAID

## 2019-02-16 DIAGNOSIS — R07.9 CHEST PAIN, UNSPECIFIED TYPE: Primary | ICD-10-CM

## 2019-02-16 DIAGNOSIS — E08.65 DIABETES MELLITUS DUE TO UNDERLYING CONDITION WITH HYPERGLYCEMIA, UNSPECIFIED WHETHER LONG TERM INSULIN USE: ICD-10-CM

## 2019-02-16 DIAGNOSIS — I10 ESSENTIAL HYPERTENSION: Chronic | ICD-10-CM

## 2019-02-16 DIAGNOSIS — R05.9 COUGH: ICD-10-CM

## 2019-02-16 DIAGNOSIS — R52 PAIN: ICD-10-CM

## 2019-02-16 DIAGNOSIS — E02 SUBCLINICAL IODINE-DEFICIENCY HYPOTHYROIDISM: Chronic | ICD-10-CM

## 2019-02-16 DIAGNOSIS — R07.89 ATYPICAL CHEST PAIN: ICD-10-CM

## 2019-02-16 DIAGNOSIS — R07.9 CHEST PAIN: ICD-10-CM

## 2019-02-16 PROBLEM — D64.9 ANEMIA: Chronic | Status: ACTIVE | Noted: 2019-02-16

## 2019-02-16 PROBLEM — Z93.0 TRACHEOSTOMY IN PLACE: Chronic | Status: ACTIVE | Noted: 2019-02-16

## 2019-02-16 PROBLEM — E03.9 HYPOTHYROID: Chronic | Status: ACTIVE | Noted: 2019-02-16

## 2019-02-16 PROBLEM — E11.65 TYPE 2 DIABETES MELLITUS WITH HYPERGLYCEMIA, WITHOUT LONG-TERM CURRENT USE OF INSULIN: Chronic | Status: ACTIVE | Noted: 2019-02-16

## 2019-02-16 PROBLEM — E78.00 HIGH CHOLESTEROL: Chronic | Status: ACTIVE | Noted: 2019-02-16

## 2019-02-16 PROBLEM — E07.9 THYROID DISEASE: Chronic | Status: ACTIVE | Noted: 2019-02-16

## 2019-02-16 PROBLEM — I50.32 CHRONIC DIASTOLIC CONGESTIVE HEART FAILURE: Chronic | Status: ACTIVE | Noted: 2019-02-16

## 2019-02-16 LAB
ALBUMIN SERPL-MCNC: 3.8 G/DL (ref 3.3–5.5)
ALP SERPL-CCNC: 96 U/L (ref 42–141)
BILIRUB SERPL-MCNC: 0.7 MG/DL (ref 0.2–1.6)
BILIRUBIN, POC UA: NEGATIVE
BLOOD, POC UA: ABNORMAL
BUN SERPL-MCNC: 18 MG/DL (ref 7–22)
CALCIUM SERPL-MCNC: 9.2 MG/DL (ref 8–10.3)
CHLORIDE SERPL-SCNC: 94 MMOL/L (ref 98–108)
CLARITY, POC UA: CLEAR
COLOR, POC UA: YELLOW
CREAT SERPL-MCNC: 1.4 MG/DL (ref 0.6–1.2)
CTP QC/QA: YES
FLUAV AG NPH QL: NEGATIVE
FLUBV AG NPH QL: NEGATIVE
GLUCOSE SERPL-MCNC: 623 MG/DL (ref 73–118)
GLUCOSE, POC UA: ABNORMAL
KETONES, POC UA: NEGATIVE
LEUKOCYTE EST, POC UA: ABNORMAL
NITRITE, POC UA: NEGATIVE
PH UR STRIP: 5.5 [PH]
POC ALT (SGPT): 26 U/L (ref 10–47)
POC AST (SGOT): 21 U/L (ref 11–38)
POC CARDIAC TROPONIN I: 0 NG/ML
POC CARDIAC TROPONIN I: 0.01 NG/ML
POC D-DI: 112 NG/ML (ref 0–450)
POC TCO2: 29 MMOL/L (ref 18–33)
POCT GLUCOSE: 389 MG/DL (ref 70–110)
POCT GLUCOSE: 397 MG/DL (ref 70–110)
POCT GLUCOSE: 409 MG/DL (ref 70–110)
POCT GLUCOSE: 449 MG/DL (ref 70–110)
POCT GLUCOSE: 469 MG/DL (ref 70–110)
POCT GLUCOSE: >500 MG/DL (ref 70–110)
POTASSIUM BLD-SCNC: 4.1 MMOL/L (ref 3.6–5.1)
PROTEIN, POC UA: ABNORMAL
PROTEIN, POC: 8.2 G/DL (ref 6.4–8.1)
SAMPLE: NORMAL
SAMPLE: NORMAL
SODIUM BLD-SCNC: 145 MMOL/L (ref 128–145)
SPECIFIC GRAVITY, POC UA: <=1.005
T4 FREE SERPL-MCNC: <0.4 NG/DL
TROPONIN I SERPL DL<=0.01 NG/ML-MCNC: <0.006 NG/ML
TROPONIN I SERPL DL<=0.01 NG/ML-MCNC: <0.006 NG/ML
TSH SERPL DL<=0.005 MIU/L-ACNC: 36.57 UIU/ML
UROBILINOGEN, POC UA: 0.2 E.U./DL

## 2019-02-16 PROCEDURE — 99285 EMERGENCY DEPT VISIT HI MDM: CPT | Mod: 25,ER

## 2019-02-16 PROCEDURE — 87804 INFLUENZA ASSAY W/OPTIC: CPT | Mod: ER

## 2019-02-16 PROCEDURE — 85025 COMPLETE CBC W/AUTO DIFF WBC: CPT | Mod: ER

## 2019-02-16 PROCEDURE — 84443 ASSAY THYROID STIM HORMONE: CPT

## 2019-02-16 PROCEDURE — 63600175 PHARM REV CODE 636 W HCPCS: Performed by: HOSPITALIST

## 2019-02-16 PROCEDURE — S5571 INSULIN DISPOS PEN 3 ML: HCPCS | Performed by: HOSPITALIST

## 2019-02-16 PROCEDURE — 96374 THER/PROPH/DIAG INJ IV PUSH: CPT | Mod: ER

## 2019-02-16 PROCEDURE — 84484 ASSAY OF TROPONIN QUANT: CPT | Mod: 91

## 2019-02-16 PROCEDURE — 36415 COLL VENOUS BLD VENIPUNCTURE: CPT

## 2019-02-16 PROCEDURE — 93010 EKG 12-LEAD: ICD-10-PCS | Mod: ,,, | Performed by: INTERNAL MEDICINE

## 2019-02-16 PROCEDURE — 93010 ELECTROCARDIOGRAM REPORT: CPT | Mod: ,,, | Performed by: INTERNAL MEDICINE

## 2019-02-16 PROCEDURE — G0378 HOSPITAL OBSERVATION PER HR: HCPCS

## 2019-02-16 PROCEDURE — 80053 COMPREHEN METABOLIC PANEL: CPT | Mod: ER

## 2019-02-16 PROCEDURE — 96361 HYDRATE IV INFUSION ADD-ON: CPT | Mod: ER

## 2019-02-16 PROCEDURE — 63600175 PHARM REV CODE 636 W HCPCS: Mod: ER | Performed by: EMERGENCY MEDICINE

## 2019-02-16 PROCEDURE — 84484 ASSAY OF TROPONIN QUANT: CPT | Mod: ER

## 2019-02-16 PROCEDURE — 83036 HEMOGLOBIN GLYCOSYLATED A1C: CPT

## 2019-02-16 PROCEDURE — 82962 GLUCOSE BLOOD TEST: CPT | Mod: ER

## 2019-02-16 PROCEDURE — 25000003 PHARM REV CODE 250: Mod: ER | Performed by: EMERGENCY MEDICINE

## 2019-02-16 PROCEDURE — 84439 ASSAY OF FREE THYROXINE: CPT

## 2019-02-16 PROCEDURE — 25000003 PHARM REV CODE 250: Performed by: HOSPITALIST

## 2019-02-16 PROCEDURE — 81003 URINALYSIS AUTO W/O SCOPE: CPT | Mod: ER

## 2019-02-16 PROCEDURE — 85379 FIBRIN DEGRADATION QUANT: CPT | Mod: ER

## 2019-02-16 PROCEDURE — 93005 ELECTROCARDIOGRAM TRACING: CPT | Mod: ER

## 2019-02-16 RX ORDER — ENOXAPARIN SODIUM 100 MG/ML
40 INJECTION SUBCUTANEOUS EVERY 24 HOURS
Status: DISCONTINUED | OUTPATIENT
Start: 2019-02-16 | End: 2019-02-17 | Stop reason: HOSPADM

## 2019-02-16 RX ORDER — DOCUSATE SODIUM 100 MG/1
100 CAPSULE, LIQUID FILLED ORAL 2 TIMES DAILY
Status: DISCONTINUED | OUTPATIENT
Start: 2019-02-16 | End: 2019-02-17 | Stop reason: HOSPADM

## 2019-02-16 RX ORDER — BUTALBITAL, ACETAMINOPHEN AND CAFFEINE 50; 325; 40 MG/1; MG/1; MG/1
1 TABLET ORAL
Status: COMPLETED | OUTPATIENT
Start: 2019-02-16 | End: 2019-02-16

## 2019-02-16 RX ORDER — RAMELTEON 8 MG/1
8 TABLET ORAL NIGHTLY PRN
Status: DISCONTINUED | OUTPATIENT
Start: 2019-02-16 | End: 2019-02-17 | Stop reason: HOSPADM

## 2019-02-16 RX ORDER — SODIUM CHLORIDE 0.9 % (FLUSH) 0.9 %
5 SYRINGE (ML) INJECTION
Status: DISCONTINUED | OUTPATIENT
Start: 2019-02-16 | End: 2019-02-17 | Stop reason: HOSPADM

## 2019-02-16 RX ORDER — ACETAMINOPHEN 325 MG/1
650 TABLET ORAL EVERY 6 HOURS PRN
Status: DISCONTINUED | OUTPATIENT
Start: 2019-02-16 | End: 2019-02-17 | Stop reason: HOSPADM

## 2019-02-16 RX ORDER — ACETAMINOPHEN 325 MG/1
650 TABLET ORAL EVERY 8 HOURS PRN
Status: DISCONTINUED | OUTPATIENT
Start: 2019-02-16 | End: 2019-02-16

## 2019-02-16 RX ORDER — ONDANSETRON 8 MG/1
8 TABLET, ORALLY DISINTEGRATING ORAL EVERY 8 HOURS PRN
Status: DISCONTINUED | OUTPATIENT
Start: 2019-02-16 | End: 2019-02-17 | Stop reason: HOSPADM

## 2019-02-16 RX ORDER — FAMOTIDINE 20 MG/1
20 TABLET, FILM COATED ORAL 2 TIMES DAILY
Status: DISCONTINUED | OUTPATIENT
Start: 2019-02-16 | End: 2019-02-16

## 2019-02-16 RX ORDER — GLUCAGON 1 MG
1 KIT INJECTION
Status: DISCONTINUED | OUTPATIENT
Start: 2019-02-16 | End: 2019-02-17 | Stop reason: HOSPADM

## 2019-02-16 RX ORDER — FUROSEMIDE 20 MG/1
20 TABLET ORAL 2 TIMES DAILY
Status: DISCONTINUED | OUTPATIENT
Start: 2019-02-16 | End: 2019-02-17 | Stop reason: HOSPADM

## 2019-02-16 RX ORDER — IBUPROFEN 200 MG
24 TABLET ORAL
Status: DISCONTINUED | OUTPATIENT
Start: 2019-02-16 | End: 2019-02-17 | Stop reason: HOSPADM

## 2019-02-16 RX ORDER — OXYCODONE AND ACETAMINOPHEN 5; 325 MG/1; MG/1
1 TABLET ORAL EVERY 8 HOURS PRN
Status: DISCONTINUED | OUTPATIENT
Start: 2019-02-16 | End: 2019-02-17 | Stop reason: HOSPADM

## 2019-02-16 RX ORDER — INSULIN ASPART 100 [IU]/ML
1-10 INJECTION, SOLUTION INTRAVENOUS; SUBCUTANEOUS
Status: DISCONTINUED | OUTPATIENT
Start: 2019-02-16 | End: 2019-02-17 | Stop reason: HOSPADM

## 2019-02-16 RX ORDER — IBUPROFEN 200 MG
16 TABLET ORAL
Status: DISCONTINUED | OUTPATIENT
Start: 2019-02-16 | End: 2019-02-17 | Stop reason: HOSPADM

## 2019-02-16 RX ORDER — CLONIDINE HYDROCHLORIDE 0.1 MG/1
0.1 TABLET ORAL EVERY 6 HOURS PRN
Status: DISCONTINUED | OUTPATIENT
Start: 2019-02-16 | End: 2019-02-17 | Stop reason: HOSPADM

## 2019-02-16 RX ORDER — CARVEDILOL 3.12 MG/1
3.12 TABLET ORAL 2 TIMES DAILY WITH MEALS
Status: DISCONTINUED | OUTPATIENT
Start: 2019-02-16 | End: 2019-02-17 | Stop reason: HOSPADM

## 2019-02-16 RX ORDER — ATORVASTATIN CALCIUM 40 MG/1
80 TABLET, FILM COATED ORAL DAILY
Status: DISCONTINUED | OUTPATIENT
Start: 2019-02-17 | End: 2019-02-17 | Stop reason: HOSPADM

## 2019-02-16 RX ORDER — FAMOTIDINE 20 MG/1
20 TABLET, FILM COATED ORAL DAILY
Status: DISCONTINUED | OUTPATIENT
Start: 2019-02-16 | End: 2019-02-17 | Stop reason: HOSPADM

## 2019-02-16 RX ORDER — HYDROXYZINE PAMOATE 25 MG/1
25 CAPSULE ORAL EVERY 8 HOURS PRN
Status: DISCONTINUED | OUTPATIENT
Start: 2019-02-16 | End: 2019-02-17 | Stop reason: HOSPADM

## 2019-02-16 RX ADMIN — INSULIN DETEMIR 20 UNITS: 100 INJECTION, SOLUTION SUBCUTANEOUS at 09:02

## 2019-02-16 RX ADMIN — ACETAMINOPHEN 650 MG: 325 TABLET, FILM COATED ORAL at 11:02

## 2019-02-16 RX ADMIN — INSULIN ASPART 5 UNITS: 100 INJECTION, SOLUTION INTRAVENOUS; SUBCUTANEOUS at 09:02

## 2019-02-16 RX ADMIN — ENOXAPARIN SODIUM 40 MG: 100 INJECTION SUBCUTANEOUS at 05:02

## 2019-02-16 RX ADMIN — INSULIN HUMAN 4 UNITS: 100 INJECTION, SOLUTION PARENTERAL at 10:02

## 2019-02-16 RX ADMIN — FUROSEMIDE 20 MG: 20 TABLET ORAL at 05:02

## 2019-02-16 RX ADMIN — BUTALBITAL, ACETAMINOPHEN AND CAFFEINE 1 TABLET: 50; 325; 40 TABLET ORAL at 10:02

## 2019-02-16 RX ADMIN — INSULIN ASPART 10 UNITS: 100 INJECTION, SOLUTION INTRAVENOUS; SUBCUTANEOUS at 05:02

## 2019-02-16 RX ADMIN — CARVEDILOL 3.12 MG: 3.12 TABLET, FILM COATED ORAL at 05:02

## 2019-02-16 RX ADMIN — SODIUM CHLORIDE 500 ML: 0.9 INJECTION, SOLUTION INTRAVENOUS at 10:02

## 2019-02-16 RX ADMIN — CLONIDINE HYDROCHLORIDE 0.1 MG: 0.1 TABLET ORAL at 06:02

## 2019-02-16 RX ADMIN — FAMOTIDINE 20 MG: 20 TABLET ORAL at 05:02

## 2019-02-16 NOTE — ASSESSMENT & PLAN NOTE
Check A1c  Hold oral antihyperglycemics while inpatient  Continue home basal insulin at moderately reduced dose along with PRN sliding scale  ACHS glucose monitoring   ADA diet

## 2019-02-16 NOTE — CARE UPDATE
Nuclear Stress Test 2/2/2017:  The control electrocardiogram shows sinus rhythm with left ventricular   hypertrophy. During the study no abnormal ST segment shifts were seen. No   arrhythmias occurred.  On scintigraphic imaging, there is normal and homogeneous perfusion of   all left ventricular wall segments at rest as well as with stress. On the   quantitative gated SPECT study, there is normal myocardial thickening and   contractility of all left ventricular wall segments. The calculated left   ventricular ejection fraction is 54%.  IMPRESSION:  1. Lexiscan Myoview SPECT perfusion stress-only study is   negative for ischemia.  2. Good left ventricular systolic function with calculated ejection   fraction of 54%.    TTE 7/14/2017:  1.Technically difficult study.  2. LV size, wall thickness and systolic function are normal, with an EF > 55%.  3. Pseudonormal LV filling consistent with abnormal relaxation pattern with mild to moderately elevated LA pressure.    Elmer Peñaloza Jr., APRN, AGACNP-BC  Hospitalist - Department of Hospital Medicine  [facility] [address] [place] [address]  Office #: [phone]; Pager #: [phone]

## 2019-02-16 NOTE — ASSESSMENT & PLAN NOTE
No evidence of acute decompensation or significant fluid overload, TTE 2017 showed EF 55% with mild diastolic dysfunction, repeat echo, continue home regimen, monitor on tele, daily weights, cardiac diet.

## 2019-02-16 NOTE — ASSESSMENT & PLAN NOTE
Poorly controlled, continue home medications and monitor blood pressure, adjust as needed.  P.r.n. clonidine available

## 2019-02-16 NOTE — ED PROVIDER NOTES
Encounter Date: 2/16/2019    SCRIBE #1 NOTE: I, Lucho Denise, am scribing for, and in the presence of,  Dr. Gallegos. I have scribed the following portions of the note - Other sections scribed: HPI, ROS, PE.       History     Chief Complaint   Patient presents with    Chills    Cough    Generalized Body Aches     onset thurs/fri    Otalgia     right ear pain     Mrs. Irasema Valverde is a 58 y.o. female who presents to the ED with a complaint of coughing with green sputum that began yesterday. Pt endorses intermittent upper sternum CP when coughing. She describes her pain as a soreness. Pt also endorses chills, nasal congestion, myalgias, and a frontal headache. She denies fever.  She has taken Tylenol with some relief. Pt suspects that she has the flu. Pt's PMHx includes CAD and CHF. Pt has had a stent put in three months ago. Pt was told that she has another blockage but the location of the blockage has not been found. Pt has a tracheal collar in place due to a collapsed lung in the past per her.     She also states she has pain in her chest that worsens when she walks. She says her chest is sore and worsens with coughing and doesn't hurt when lying or sitting still.       The history is provided by the patient.     Review of patient's allergies indicates:   Allergen Reactions    Codeine Itching     Past Medical History:   Diagnosis Date    Anemia     Arthritis     Asthma     CHF (congestive heart failure)     Ear infection     chronic    Glossopharyngeal neuralgia     High cholesterol     Hypertension     Hypothyroid     Lung disease     Migraine headache     Thyroid disease     Tracheostomy in place     Umbilical hernia     Vertigo      Past Surgical History:   Procedure Laterality Date    EXAM UNDER ANESTHESIA Left 4/30/2013    Performed by [facility] at [facility] OR 2ND FLR    HERNIA REPAIR      HYSTERECTOMY      REMOVAL, TYMPANOSTOMY TUBE Right 4/30/2013    Performed by José Miguel LOPEZ  MD [facility] at [facility]    Right chronic otitiis media with mucocle      THYROID SURGERY      TRACHEAL SURGERY       Family History   Problem Relation Age of Onset    Hypertension Mother     Hyperlipidemia Mother     Hypertension Brother     Hyperlipidemia Brother     Diabetes Brother      Social History     Tobacco Use    Smoking status: Never Smoker   Substance Use Topics    Alcohol use: Yes     Comment: occasional    Drug use: No     Review of Systems   Constitutional: Positive for chills. Negative for fever.   HENT: Positive for congestion.    Respiratory: Positive for cough.    Cardiovascular: Positive for chest pain (When coughing).   Musculoskeletal: Positive for myalgias.   Neurological: Positive for headaches.   All other systems reviewed and are negative.      Physical Exam     Initial Vitals [02/16/19 0840]   BP Pulse Resp Temp SpO2   123/81 89 20 99 °F (37.2 °C) 96 %      MAP       --         Physical Exam    Nursing note and vitals reviewed.  Constitutional: She appears well-developed and well-nourished. She is Obese .   HENT:   Head: Normocephalic and atraumatic.   Right Ear: External ear normal.   Left Ear: External ear normal.   Eyes: Conjunctivae are normal.   Neck: Normal range of motion. Neck supple. No spinous process tenderness and no muscular tenderness present. No edema, no erythema and normal range of motion present. No neck rigidity.   Trach collar in place with no neck swelling or other abnormality noted.    Cardiovascular: Normal rate, regular rhythm, normal heart sounds and intact distal pulses. Exam reveals no gallop and no friction rub.    No murmur heard.  Pulmonary/Chest: Effort normal and breath sounds normal. No respiratory distress. She has no wheezes. She has no rhonchi. She has no rales. She exhibits no tenderness.   Distant breath sounds.    Abdominal: Soft. Bowel sounds are normal. She exhibits no distension and no mass. There is no tenderness. There is no  rebound and no guarding.   Musculoskeletal: Normal range of motion.        Right lower leg: She exhibits swelling and edema. She exhibits no tenderness and no bony tenderness.        Left lower leg: She exhibits swelling and edema. She exhibits no tenderness and no bony tenderness.   Neurological: She is alert and oriented to person, place, and time.   Skin: Skin is warm and dry.   Psychiatric: She has a normal mood and affect.         ED Course   Procedures  Labs Reviewed   POCT URINALYSIS W/O SCOPE - Abnormal; Notable for the following components:       Result Value    Glucose, UA 2+ (*)     Bilirubin, UA Negative (*)     Ketones, UA Negative (*)     Spec Grav UA <=1.005 (*)     Blood, UA Trace-lysed (*)     Protein, UA Trace (*)     Nitrite, UA Negative (*)     Leukocytes, UA Trace (*)     All other components within normal limits   POCT CMP - Abnormal; Notable for the following components:    POC Chloride 94 (*)     POC Creatinine 1.4 (*)     POC Glucose 623 (*)     Protein 8.2 (*)     All other components within normal limits   POCT GLUCOSE - Abnormal; Notable for the following components:    POCT Glucose >500 (*)     All other components within normal limits   POCT GLUCOSE - Abnormal; Notable for the following components:    POCT Glucose 469 (*)     All other components within normal limits   TROPONIN ISTAT   TROPONIN ISTAT   POCT INFLUENZA A/B   POCT CBC   POCT URINALYSIS W/O SCOPE   POCT GLUCOSE, HAND-HELD DEVICE   POCT GLUCOSE, HAND-HELD DEVICE   POCT CMP   POCT TROPONIN   POCT TROPONIN   POCT D DIMER   POCT D DIMER     EKG Readings: (Independently Interpreted)   Initial Reading: No STEMI. Rhythm: Normal Sinus Rhythm. Ectopy: No Ectopy. Q Waves: III and AVF.       Imaging Results          X-Ray Chest AP Portable (Final result)  Result time 02/16/19 11:18:55    Final result by Yusef Liu DO (02/16/19 11:18:55)                 Impression:      No acute cardiopulmonary process.      Electronically  "signed by: Yusef Liu DO  Date:    02/16/2019  Time:    11:18             Narrative:    EXAMINATION:  XR CHEST AP PORTABLE    CLINICAL HISTORY:  Cough    TECHNIQUE:  Single frontal view of the chest was performed.    COMPARISON:  12/02/2017    FINDINGS:  No infiltrates or pleural effusions.  The heart is enlarged.  A tracheostomy tube is present.                                 Medical Decision Making:   Clinical Tests:   Lab Tests: Ordered and Reviewed  Radiological Study: Ordered and Reviewed  Medical Tests: Ordered and Reviewed  ED Management:  Ms Valverde has been stable during her time in the ER. She states she still feels chest 'ache' "when I walked to the restroom". States she feels the cough is improved.   She has a mixed clinical picture. She has reproducible pain and tenderness to chest wall palpation. She has a cough. But she also distinctly says several times that she has pain only when she walks around and not at rest. She has several cardiac risk factors.   I don't know her historical EF. She is on lasix 80mg daily per home meds noted. Thus I'm unable to give her a lot of fluids quickly to get her glucose down. She does need some further hydration to get glucose lower. No evidence of dka. Unable to perform betahydroxybutyrate in this ed.     It is appropriate to admit her to the hospital for further monitoring related to ACS rule out.   Discussed pt with SUDHA Payne with obs service. Pt stable at this time. Awaiting transport.     Additional MDM:   Heart Score:    History:          Moderately suspicious.  ECG:             Nonspecific repolarisation disturbance  Age:               45-65 years  Risk factors: >= 3 risk factors or history of atherosclerotic disease  Troponin:       Less than or equal to normal limit  Final Score: 5             Scribe Attestation:   Scribe #1: I performed the above scribed service and the documentation accurately describes the services I performed. I attest to the accuracy of " the note.               Clinical Impression:     1. Chest pain, unspecified type    2. Pain    3. Cough    4. Diabetes mellitus due to underlying condition with hyperglycemia, unspecified whether long term insulin use                                  Mikey Gallegos MD  02/16/19 1330

## 2019-02-16 NOTE — HPI
58 y.o. female obesity, hypertension, hyperlipidemia, dm 2, hypothyroidism, chronic diastolic heart failure, asthma, tracheostomy in place, glossopharyngeal neuralgia, and anemia presents to the ED with complaints cough productive of green sputum, chills, and generalized myalgias.  She also endorses chest pain that was gradual in onset, located throughout the upper chest, described as soreness, exacerbated by coughing, relieved with rest.  She denies fever, SOB, palpitations, dizziness, syncope, nausea, vomiting, diarrhea, abdominal pain, bloody or black stools, dysuria, frequency, or urgency.  She does not have CAD nor did she see a cardiologist.  Echo and stress test 2017 revealed normal myocardial perfusion and preserved EF, only mild diastolic dysfunction.  In the ED she was found to be hyperglycemic without evidence of DKA for which she received normal saline bolus and IV insulin.  Routine labs otherwise unremarkable, she is without fever or leukocytosis, rapid flu swab negative, chest x-ray without focal consolidation or opacity or other acute cardiopulmonary process, urinalysis without evidence of infection.  There was concern in the ED about the patient's chest pain. EKG without evidence of acute ischemia and unchanged from prior in 2017, initial troponin negative.  Placed in observation for ACS rule out.

## 2019-02-16 NOTE — ASSESSMENT & PLAN NOTE
Atypical soreness associated with cough and reproducible with palpation, EKG without evidence of acute ischemia and unchanged from prior in 2017, initial troponin negative, doubt ACS.  HEART score 3.  -cardiac monitoring  -serial troponins  -2D echo

## 2019-02-16 NOTE — ED NOTES
Attempted first call cn was starting an iv and needed to assign. I was informed to call back in 5 min. V/u

## 2019-02-16 NOTE — ED NOTES
Report given to cara springer cn. Awaiting aasi for transport. Pt updated on poc. Transfer paperwork ready.

## 2019-02-16 NOTE — SUBJECTIVE & OBJECTIVE
Past Medical History:   Diagnosis Date    Anemia     Arthritis     Asthma     CHF (congestive heart failure)     Ear infection     chronic    Glossopharyngeal neuralgia     High cholesterol     Hypertension     Hypothyroid     Lung disease     Migraine headache     Thyroid disease     Tracheostomy in place     Umbilical hernia     Vertigo        Past Surgical History:   Procedure Laterality Date    EXAM UNDER ANESTHESIA Left 4/30/2013    Performed by [facility] at [facility] OR 2ND FLR    HERNIA REPAIR      HYSTERECTOMY      REMOVAL, TYMPANOSTOMY TUBE Right 4/30/2013    Performed by José [facility] at [facility] OR 2ND FLR    Right chronic otitiis media with mucocle      THYROID SURGERY      TRACHEAL SURGERY         Review of patient's allergies indicates:   Allergen Reactions    Codeine Itching       No current facility-administered medications on file prior to encounter.      Current Outpatient Medications on File Prior to Encounter   Medication Sig    acetaminophen (TYLENOL) 325 MG tablet Take 2 tablets (650 mg total) by mouth every 4 to 6 hours as needed for Pain.    albuterol 90 mcg/actuation inhaler Inhale 2 puffs into the lungs every 4 (four) hours as needed for Wheezing. Rescue  Use with spacer  DiSpence with one spacer    atorvastatin (LIPITOR) 80 MG tablet Take 80 mg by mouth once daily.      carbamide peroxide (DEBROX) 6.5 % otic solution Place 5 drops into both ears as needed.    carvedilol (COREG) 3.125 MG tablet Take 3.125 mg by mouth 2 (two) times daily with meals.    diazepam (VALIUM) 5 MG tablet Take 5 mg by mouth every 6 (six) hours as needed.    dicyclomine (BENTYL) 20 mg tablet Take 1 tablet (20 mg total) by mouth every 6 (six) hours as needed (every 6 hours as needed for pain).    dicyclomine (BENTYL) 20 mg tablet Take 1 tablet (20 mg total) by mouth every 6 (six) hours.    docusate sodium (COLACE) 100 MG capsule Take 100 mg by mouth 2 (two) times daily.       fluticasone-salmeterol 250-50 mcg/dose (ADVAIR DISKUS) 250-50 mcg/dose diskus inhaler Inhale 1 puff into the lungs 2 (two) times daily.      furosemide (LASIX) 80 MG tablet Take 20 mg by mouth every 12 (twelve) hours.     ibuprofen (ADVIL,MOTRIN) 800 MG tablet Take 1 tablet (800 mg total) by mouth every 6 (six) hours as needed for Pain.    levothyroxine (SYNTHROID) 200 MCG tablet Take 200 mcg by mouth once daily.    lisinopril 10 MG tablet Take 10 mg by mouth once daily.    metoprolol succinate (TOPROL-XL) 25 MG 24 hr tablet Take 25 mg by mouth once daily.    naproxen (NAPROSYN) 500 MG tablet Take 1 tablet (500 mg total) by mouth 2 (two) times daily with meals.    neomycin-polymyxin-hydrocortisone (CORTISPORIN) 3.5-10,000-1 mg-unit/mL-% otic suspension Please place 4 drops into the right ear every 6 hours, 4 times a day    pantoprazole (PROTONIX) 40 MG tablet Take 1 tablet (40 mg total) by mouth once daily.     Family History     Problem Relation (Age of Onset)    Diabetes Brother    Hyperlipidemia Mother, Brother    Hypertension Mother, Brother        Tobacco Use    Smoking status: Never Smoker   Substance and Sexual Activity    Alcohol use: Yes     Comment: occasional    Drug use: No    Sexual activity: Not on file     Review of Systems   Constitutional: Positive for chills. Negative for fatigue and fever.   Eyes: Negative for photophobia and visual disturbance.   Respiratory: Positive for cough and chest tightness. Negative for shortness of breath.    Cardiovascular: Positive for chest pain. Negative for palpitations and leg swelling.   Gastrointestinal: Negative for abdominal pain, diarrhea, nausea and vomiting.   Genitourinary: Negative for dysuria, frequency and urgency.   Musculoskeletal: Positive for myalgias.   Skin: Negative for pallor, rash and wound.   Neurological: Negative for dizziness, syncope, light-headedness and headaches.   Psychiatric/Behavioral: Negative for confusion and  decreased concentration.     Objective:     Vital Signs (Most Recent):  Temp: 98.2 °F (36.8 °C) (02/16/19 1539)  Pulse: 84 (02/16/19 1539)  Resp: 16 (02/16/19 1539)  BP: (!) 152/79 (02/16/19 1539)  SpO2: 98 % (02/16/19 1536) Vital Signs (24h Range):  Temp:  [98.2 °F (36.8 °C)-99 °F (37.2 °C)] 98.2 °F (36.8 °C)  Pulse:  [72-89] 84  Resp:  [16-20] 16  SpO2:  [96 %-99 %] 98 %  BP: (123-173)/(64-81) 152/79     Weight: 112.9 kg (249 lb)  Body mass index is 45.54 kg/m².    Physical Exam   Constitutional: She is oriented to person, place, and time. She appears well-developed and well-nourished. No distress.   obese   HENT:   Head: Normocephalic and atraumatic.   Right Ear: External ear normal.   Left Ear: External ear normal.   Nose: Nose normal.   Mouth/Throat: Oropharynx is clear and moist.   Eyes: Conjunctivae and EOM are normal. Pupils are equal, round, and reactive to light.   Neck: Normal range of motion. Neck supple.   Tracheostomy, patent without complication   Cardiovascular: Normal rate, regular rhythm and intact distal pulses.   Pulmonary/Chest: Effort normal and breath sounds normal. No respiratory distress. She has no wheezes.   Abdominal: Soft. Bowel sounds are normal. She exhibits no distension. There is no tenderness.   No palpable hepatomegaly or splenomegaly    Musculoskeletal: Normal range of motion. She exhibits edema (1+ bilateral lower extremities). She exhibits no tenderness.   Neurological: She is alert and oriented to person, place, and time.   Skin: Skin is warm and dry.   Psychiatric: She has a normal mood and affect. Thought content normal.   Nursing note and vitals reviewed.        CRANIAL NERVES     CN III, IV, VI   Pupils are equal, round, and reactive to light.  Extraocular motions are normal.        Significant Labs: All pertinent labs within the past 24 hours have been reviewed.    Significant Imaging: I have reviewed all pertinent imaging results/findings within the past 24 hours.

## 2019-02-16 NOTE — H&P
[facility] Medicine  History & Physical    Patient Name: Irasema Valverde  MRN: [id number]  Admission Date: 2/16/2019  Attending Physician: Olga Madison MD   Primary Care Provider: Corry Rao MD         Patient information was obtained from patient, spouse/SO, past medical records and ER records.     Subjective:     Principal Problem:Atypical chest pain    Chief Complaint:   Chief Complaint   Patient presents with    Chills    Cough    Generalized Body Aches     onset thurs/fri    Otalgia     right ear pain        HPI: 58 y.o. female obesity, hypertension, hyperlipidemia, dm 2, hypothyroidism, chronic diastolic heart failure, asthma, tracheostomy in place, glossopharyngeal neuralgia, and anemia presents to the ED with complaints cough productive of green sputum, chills, and generalized myalgias.  She also endorses chest pain that was gradual in onset, located throughout the upper chest, described as soreness, exacerbated by coughing, relieved with rest.  She denies fever, SOB, palpitations, dizziness, syncope, nausea, vomiting, diarrhea, abdominal pain, bloody or black stools, dysuria, frequency, or urgency.  She does not have CAD nor did she see a cardiologist.  Echo and stress test 2017 revealed normal myocardial perfusion and preserved EF, only mild diastolic dysfunction.  In the ED she was found to be hyperglycemic without evidence of DKA for which she received normal saline bolus and IV insulin.  Routine labs otherwise unremarkable, she is without fever or leukocytosis, rapid flu swab negative, chest x-ray without focal consolidation or opacity or other acute cardiopulmonary process, urinalysis without evidence of infection.  There was concern in the ED about the patient's chest pain. EKG without evidence of acute ischemia and unchanged from prior in 2017, initial troponin negative.  Placed in observation for ACS rule out.    Past Medical History:   Diagnosis Date    Anemia      Arthritis     Asthma     CHF (congestive heart failure)     Ear infection     chronic    Glossopharyngeal neuralgia     High cholesterol     Hypertension     Hypothyroid     Lung disease     Migraine headache     Thyroid disease     Tracheostomy in place     Umbilical hernia     Vertigo        Past Surgical History:   Procedure Laterality Date    EXAM UNDER ANESTHESIA Left 4/30/2013    Performed by José Miguel [facility]cclelland [facility] at [facility] OR 2ND FLR    HERNIA REPAIR      HYSTERECTOMY      REMOVAL, TYMPANOSTOMY TUBE Right 4/30/2013    Performed by José [facility]cc[facility]ll[facility] at [facility] OR 2ND FLR    Right chronic otitiis media with mucocle      THYROID SURGERY      TRACHEAL SURGERY         Review of patient's allergies indicates:   Allergen Reactions    Codeine Itching       No current facility-administered medications on file prior to encounter.      Current Outpatient Medications on File Prior to Encounter   Medication Sig    acetaminophen (TYLENOL) 325 MG tablet Take 2 tablets (650 mg total) by mouth every 4 to 6 hours as needed for Pain.    albuterol 90 mcg/actuation inhaler Inhale 2 puffs into the lungs every 4 (four) hours as needed for Wheezing. Rescue  Use with spacer  DiSpence with one spacer    atorvastatin (LIPITOR) 80 MG tablet Take 80 mg by mouth once daily.      carbamide peroxide (DEBROX) 6.5 % otic solution Place 5 drops into both ears as needed.    carvedilol (COREG) 3.125 MG tablet Take 3.125 mg by mouth 2 (two) times daily with meals.    diazepam (VALIUM) 5 MG tablet Take 5 mg by mouth every 6 (six) hours as needed.    dicyclomine (BENTYL) 20 mg tablet Take 1 tablet (20 mg total) by mouth every 6 (six) hours as needed (every 6 hours as needed for pain).    dicyclomine (BENTYL) 20 mg tablet Take 1 tablet (20 mg total) by mouth every 6 (six) hours.    docusate sodium (COLACE) 100 MG capsule Take 100 mg by mouth 2 (two) times daily.      fluticasone-salmeterol 250-50 mcg/dose (ADVAIR  DISKUS) 250-50 mcg/dose diskus inhaler Inhale 1 puff into the lungs 2 (two) times daily.      furosemide (LASIX) 80 MG tablet Take 20 mg by mouth every 12 (twelve) hours.     ibuprofen (ADVIL,MOTRIN) 800 MG tablet Take 1 tablet (800 mg total) by mouth every 6 (six) hours as needed for Pain.    levothyroxine (SYNTHROID) 200 MCG tablet Take 200 mcg by mouth once daily.    lisinopril 10 MG tablet Take 10 mg by mouth once daily.    metoprolol succinate (TOPROL-XL) 25 MG 24 hr tablet Take 25 mg by mouth once daily.    naproxen (NAPROSYN) 500 MG tablet Take 1 tablet (500 mg total) by mouth 2 (two) times daily with meals.    neomycin-polymyxin-hydrocortisone (CORTISPORIN) 3.5-10,000-1 mg-unit/mL-% otic suspension Please place 4 drops into the right ear every 6 hours, 4 times a day    pantoprazole (PROTONIX) 40 MG tablet Take 1 tablet (40 mg total) by mouth once daily.     Family History     Problem Relation (Age of Onset)    Diabetes Brother    Hyperlipidemia Mother, Brother    Hypertension Mother, Brother        Tobacco Use    Smoking status: Never Smoker   Substance and Sexual Activity    Alcohol use: Yes     Comment: occasional    Drug use: No    Sexual activity: Not on file     Review of Systems   Constitutional: Positive for chills. Negative for fatigue and fever.   Eyes: Negative for photophobia and visual disturbance.   Respiratory: Positive for cough and chest tightness. Negative for shortness of breath.    Cardiovascular: Positive for chest pain. Negative for palpitations and leg swelling.   Gastrointestinal: Negative for abdominal pain, diarrhea, nausea and vomiting.   Genitourinary: Negative for dysuria, frequency and urgency.   Musculoskeletal: Positive for myalgias.   Skin: Negative for pallor, rash and wound.   Neurological: Negative for dizziness, syncope, light-headedness and headaches.   Psychiatric/Behavioral: Negative for confusion and decreased concentration.     Objective:     Vital Signs  (Most Recent):  Temp: 98.2 °F (36.8 °C) (02/16/19 1539)  Pulse: 84 (02/16/19 1539)  Resp: 16 (02/16/19 1539)  BP: (!) 152/79 (02/16/19 1539)  SpO2: 98 % (02/16/19 1536) Vital Signs (24h Range):  Temp:  [98.2 °F (36.8 °C)-99 °F (37.2 °C)] 98.2 °F (36.8 °C)  Pulse:  [72-89] 84  Resp:  [16-20] 16  SpO2:  [96 %-99 %] 98 %  BP: (123-173)/(64-81) 152/79     Weight: 112.9 kg (249 lb)  Body mass index is 45.54 kg/m².    Physical Exam   Constitutional: She is oriented to person, place, and time. She appears well-developed and well-nourished. No distress.   obese   HENT:   Head: Normocephalic and atraumatic.   Right Ear: External ear normal.   Left Ear: External ear normal.   Nose: Nose normal.   Mouth/Throat: Oropharynx is clear and moist.   Eyes: Conjunctivae and EOM are normal. Pupils are equal, round, and reactive to light.   Neck: Normal range of motion. Neck supple.   Tracheostomy, patent without complication   Cardiovascular: Normal rate, regular rhythm and intact distal pulses.   Pulmonary/Chest: Effort normal and breath sounds normal. No respiratory distress. She has no wheezes.   Abdominal: Soft. Bowel sounds are normal. She exhibits no distension. There is no tenderness.   No palpable hepatomegaly or splenomegaly    Musculoskeletal: Normal range of motion. She exhibits edema (1+ bilateral lower extremities). She exhibits no tenderness.   Neurological: She is alert and oriented to person, place, and time.   Skin: Skin is warm and dry.   Psychiatric: She has a normal mood and affect. Thought content normal.   Nursing note and vitals reviewed.        CRANIAL NERVES     CN III, IV, VI   Pupils are equal, round, and reactive to light.  Extraocular motions are normal.        Significant Labs: All pertinent labs within the past 24 hours have been reviewed.    Significant Imaging: I have reviewed all pertinent imaging results/findings within the past 24 hours.    Assessment/Plan:     * Atypical chest pain    Atypical  soreness associated with cough and reproducible with palpation, EKG without evidence of acute ischemia and unchanged from prior in 2017, initial troponin negative, doubt ACS.  HEART score 3.  -cardiac monitoring  -serial troponins  -2D echo     Type 2 diabetes mellitus with hyperglycemia, without long-term current use of insulin    Check A1c  Hold oral antihyperglycemics while inpatient  Continue home basal insulin at moderately reduced dose along with PRN sliding scale  ACHS glucose monitoring   ADA diet     Anemia    Patient H/H stable and consistent with baseline. No evidence acute bleeding or indication for transfusion at this time.      Hypothyroid    Check TSH, continue Synthroid     Chronic diastolic congestive heart failure    No evidence of acute decompensation or significant fluid overload, TTE 2017 showed EF 55% with mild diastolic dysfunction, repeat echo, continue home regimen, monitor on tele, daily weights, cardiac diet.     Tracheostomy in place    Stable, no acute issue     High cholesterol    Continue statin     Essential hypertension    Poorly controlled, continue home medications and monitor blood pressure, adjust as needed.  P.r.n. clonidine available       VTE Risk Mitigation (From admission, onward)        Ordered     enoxaparin injection 40 mg  Daily      02/16/19 1618     IP VTE HIGH RISK PATIENT  Once      02/16/19 1619     Place AYALA hose  Until discontinued      02/16/19 1619        Elmer Peñaloza Jr., APRN, AGACNP-BC  Hospitalist - Department of Hospital Medicine  [facility] [address] [place] [address]  Office #: [phone]; Pager #: [phone]

## 2019-02-17 VITALS
WEIGHT: 250 LBS | HEIGHT: 62 IN | TEMPERATURE: 99 F | BODY MASS INDEX: 46.01 KG/M2 | SYSTOLIC BLOOD PRESSURE: 141 MMHG | OXYGEN SATURATION: 98 % | HEART RATE: 77 BPM | RESPIRATION RATE: 18 BRPM | DIASTOLIC BLOOD PRESSURE: 88 MMHG

## 2019-02-17 LAB
ALBUMIN SERPL BCP-MCNC: 3.2 G/DL
ALP SERPL-CCNC: 90 U/L
ALT SERPL W/O P-5'-P-CCNC: 15 U/L
ANION GAP SERPL CALC-SCNC: 12 MMOL/L
AORTIC ROOT ANNULUS: 2.54 CM
AORTIC VALVE CUSP SEPERATION: 1.72 CM
ASCENDING AORTA: 2.4 CM
AST SERPL-CCNC: 11 U/L
AV INDEX (PROSTH): 0.66
AV MEAN GRADIENT: 4.52 MMHG
AV PEAK GRADIENT: 7.29 MMHG
AV VALVE AREA: 2.25 CM2
AV VELOCITY RATIO: 0.68
BASOPHILS # BLD AUTO: 0.02 K/UL
BASOPHILS NFR BLD: 0.5 %
BILIRUB SERPL-MCNC: 0.5 MG/DL
BSA FOR ECHO PROCEDURE: 2.23 M2
BUN SERPL-MCNC: 16 MG/DL
CALCIUM SERPL-MCNC: 8.5 MG/DL
CHLORIDE SERPL-SCNC: 99 MMOL/L
CO2 SERPL-SCNC: 28 MMOL/L
CREAT SERPL-MCNC: 1.3 MG/DL
CV ECHO LV RWT: 0.74 CM
DIFFERENTIAL METHOD: ABNORMAL
DOP CALC AO PEAK VEL: 1.35 M/S
DOP CALC AO VTI: 34.67 CM
DOP CALC LVOT AREA: 3.43 CM2
DOP CALC LVOT DIAMETER: 2.09 CM
DOP CALC LVOT PEAK VEL: 0.92 M/S
DOP CALC LVOT STROKE VOLUME: 77.94 CM3
DOP CALCLVOT PEAK VEL VTI: 22.73 CM
E WAVE DECELERATION TIME: 207.55 MSEC
E/A RATIO: 1.7
E/E' RATIO: 10.21
ECHO LV POSTERIOR WALL: 1.55 CM (ref 0.6–1.1)
EOSINOPHIL # BLD AUTO: 0.1 K/UL
EOSINOPHIL NFR BLD: 1.4 %
ERYTHROCYTE [DISTWIDTH] IN BLOOD BY AUTOMATED COUNT: 15.8 %
EST. GFR  (AFRICAN AMERICAN): 53 ML/MIN/1.73 M^2
EST. GFR  (NON AFRICAN AMERICAN): 46 ML/MIN/1.73 M^2
ESTIMATED AVG GLUCOSE: 335 MG/DL
FRACTIONAL SHORTENING: 37 % (ref 28–44)
GIANT PLATELETS BLD QL SMEAR: PRESENT
GLUCOSE SERPL-MCNC: 367 MG/DL
HBA1C MFR BLD HPLC: 13.3 %
HCT VFR BLD AUTO: 34.7 %
HGB BLD-MCNC: 11 G/DL
INTERVENTRICULAR SEPTUM: 1.38 CM (ref 0.6–1.1)
IVRT: 0.13 MSEC
LA MAJOR: 5.69 CM
LA MINOR: 5.38 CM
LA WIDTH: 3.98 CM
LEFT ATRIUM SIZE: 3.29 CM
LEFT ATRIUM VOLUME INDEX: 29.3 ML/M2
LEFT ATRIUM VOLUME: 61.56 CM3
LEFT INTERNAL DIMENSION IN SYSTOLE: 2.61 CM (ref 2.1–4)
LEFT VENTRICLE DIASTOLIC VOLUME INDEX: 36.76 ML/M2
LEFT VENTRICLE DIASTOLIC VOLUME: 77.26 ML
LEFT VENTRICLE MASS INDEX: 113.3 G/M2
LEFT VENTRICLE SYSTOLIC VOLUME INDEX: 11.8 ML/M2
LEFT VENTRICLE SYSTOLIC VOLUME: 24.76 ML
LEFT VENTRICULAR INTERNAL DIMENSION IN DIASTOLE: 4.17 CM (ref 3.5–6)
LEFT VENTRICULAR MASS: 238.05 G
LV LATERAL E/E' RATIO: 9.7
LV SEPTAL E/E' RATIO: 10.78
LYMPHOCYTES # BLD AUTO: 1.6 K/UL
LYMPHOCYTES NFR BLD: 38.2 %
MCH RBC QN AUTO: 28.8 PG
MCHC RBC AUTO-ENTMCNC: 31.7 G/DL
MCV RBC AUTO: 91 FL
MONOCYTES # BLD AUTO: 0.2 K/UL
MONOCYTES NFR BLD: 4.2 %
MV PEAK A VEL: 0.57 M/S
MV PEAK E VEL: 0.97 M/S
NEUTROPHILS # BLD AUTO: 2.4 K/UL
NEUTROPHILS NFR BLD: 55.9 %
PISA TR MAX VEL: 1.76 M/S
PLATELET # BLD AUTO: 109 K/UL
PLATELET BLD QL SMEAR: ABNORMAL
PMV BLD AUTO: ABNORMAL FL
POCT GLUCOSE: 333 MG/DL (ref 70–110)
POCT GLUCOSE: 370 MG/DL (ref 70–110)
POTASSIUM SERPL-SCNC: 3.6 MMOL/L
PROT SERPL-MCNC: 7.1 G/DL
PULM VEIN S/D RATIO: 1.47
PV PEAK D VEL: 0.36 M/S
PV PEAK S VEL: 0.53 M/S
PV PEAK VELOCITY: 0.87 CM/S
RA MAJOR: 5.2 CM
RA WIDTH: 3.83 CM
RBC # BLD AUTO: 3.82 M/UL
RIGHT VENTRICULAR END-DIASTOLIC DIMENSION: 4 CM
RV TISSUE DOPPLER FREE WALL SYSTOLIC VELOCITY 1 (APICAL 4 CHAMBER VIEW): 8.43 M/S
SINUS: 2.93 CM
SODIUM SERPL-SCNC: 139 MMOL/L
STJ: 2.39 CM
TDI LATERAL: 0.1
TDI SEPTAL: 0.09
TDI: 0.1
TR MAX PG: 12.39 MMHG
TRICUSPID ANNULAR PLANE SYSTOLIC EXCURSION: 1.81 CM
WBC # BLD AUTO: 4.27 K/UL

## 2019-02-17 PROCEDURE — 36415 COLL VENOUS BLD VENIPUNCTURE: CPT

## 2019-02-17 PROCEDURE — 85025 COMPLETE CBC W/AUTO DIFF WBC: CPT

## 2019-02-17 PROCEDURE — 80053 COMPREHEN METABOLIC PANEL: CPT

## 2019-02-17 PROCEDURE — 25000003 PHARM REV CODE 250: Performed by: NURSE PRACTITIONER

## 2019-02-17 PROCEDURE — 25000003 PHARM REV CODE 250: Performed by: HOSPITALIST

## 2019-02-17 PROCEDURE — G0378 HOSPITAL OBSERVATION PER HR: HCPCS

## 2019-02-17 RX ORDER — PREDNISONE 20 MG/1
40 TABLET ORAL DAILY
Qty: 10 TABLET | Refills: 0 | Status: SHIPPED | OUTPATIENT
Start: 2019-02-17 | End: 2019-02-17 | Stop reason: HOSPADM

## 2019-02-17 RX ORDER — LEVOTHYROXINE SODIUM 200 UG/1
300 TABLET ORAL NIGHTLY
Qty: 90 TABLET | Refills: 3 | Status: SHIPPED | OUTPATIENT
Start: 2019-02-17 | End: 2021-11-21 | Stop reason: SDUPTHER

## 2019-02-17 RX ORDER — LEVOTHYROXINE SODIUM 100 UG/1
200 TABLET ORAL
Status: DISCONTINUED | OUTPATIENT
Start: 2019-02-17 | End: 2019-02-17 | Stop reason: HOSPADM

## 2019-02-17 RX ORDER — LEVOFLOXACIN 25 MG/ML
500 SOLUTION ORAL DAILY
Qty: 80 ML | Refills: 0 | Status: SHIPPED | OUTPATIENT
Start: 2019-02-17 | End: 2019-02-21

## 2019-02-17 RX ORDER — LEVOFLOXACIN 500 MG/1
500 TABLET, FILM COATED ORAL ONCE
Status: DISCONTINUED | OUTPATIENT
Start: 2019-02-17 | End: 2019-02-17 | Stop reason: HOSPADM

## 2019-02-17 RX ADMIN — FUROSEMIDE 20 MG: 20 TABLET ORAL at 08:02

## 2019-02-17 RX ADMIN — INSULIN ASPART 8 UNITS: 100 INJECTION, SOLUTION INTRAVENOUS; SUBCUTANEOUS at 11:02

## 2019-02-17 RX ADMIN — LEVOTHYROXINE SODIUM 200 MCG: 100 TABLET ORAL at 07:02

## 2019-02-17 RX ADMIN — ATORVASTATIN CALCIUM 80 MG: 40 TABLET, FILM COATED ORAL at 08:02

## 2019-02-17 RX ADMIN — CARVEDILOL 3.12 MG: 3.12 TABLET, FILM COATED ORAL at 08:02

## 2019-02-17 RX ADMIN — FAMOTIDINE 20 MG: 20 TABLET ORAL at 08:02

## 2019-02-17 RX ADMIN — INSULIN ASPART 10 UNITS: 100 INJECTION, SOLUTION INTRAVENOUS; SUBCUTANEOUS at 08:02

## 2019-02-17 NOTE — PLAN OF CARE
Problem: Pain Acute  Goal: Optimal Pain Control  Outcome: Ongoing (interventions implemented as appropriate)  Intervention: Develop Pain Management Plan   02/17/19 0355   Prevent or Manage Pain   Pain Management Interventions breathing exercises;pain management plan reviewed with patient/caregiver;pillow support provided;prescribed exercises encouraged;quiet environment facilitated;relaxation techniques promoted      02/17/19 0355   Prevent or Manage Pain   Pain Management Interventions breathing exercises;pain management plan reviewed with patient/caregiver;pillow support provided;prescribed exercises encouraged;quiet environment facilitated;relaxation techniques promoted     Intervention: Prevent or Manage Pain   02/17/19 0355   Prevent or Manage Pain   Sensory Stimulation Regulation lighting decreased;music/television provided for relaxation   Sleep/Rest Enhancement relaxation techniques promoted;regular sleep/rest pattern promoted;awakenings minimized     Intervention: Optimize Psychosocial Wellbeing   02/17/19 0355   Promote Anxiety Reduction   Supportive Measures relaxation techniques promoted;verbalization of feelings encouraged

## 2019-02-17 NOTE — PROGRESS NOTES
KELLYW contacted [facility] ; Mala, on-call, accepted patient for services; faxed and sent through [facility] to agency. Informed nurse, Francis, that patient is ready for discharge.

## 2019-02-17 NOTE — NURSING
Discharge instructions given to patient and family at bedside. Patient and family verbalized an understanding and states a willingness to comply. Saline lock removed. Tele monitoring removed.

## 2019-02-17 NOTE — HOSPITAL COURSE
58-year-old patient with a trach collar presents with persistent productive cough (green phlegm), with associated shortness of breath, and orthopnea.  Patient reports 2-3 days of coughing and pleuritic chest pain. She ruled out ACS with serial negative troponins, and nonischemic EKG.  She reports recent sick contact her  who is at the bedside was diagnosed with pneumonia recently.  Patient remained afebrile without leukocytosis, noted to have mild dehydration, and elevated TSH = 36.574, which is lower than her previous in 2012 she presented with over 56.  She tells me she has been taking her medication together in the morning; patient was instructed and educated on correct administration of Synthroid she verbalizes understanding, she tells me that she takes 300 mcg a day; she has been instructed to take this medication at night along with a simple water before bed.  That way she does not accidentally missed a dose or the medication cannot interfere with her other medication administration.  For her green sputum patient will be prescribed antibiotics, will use broad-spectrum secondary to patient's increased risk of infection due to trach/diabetes.  She is in otherwise good spirits, her oxygen sat remains over 97% at rest on room air, her vitals are grossly normal, and she is anxious for discharge.

## 2019-02-17 NOTE — DISCHARGE SUMMARY
[facility] Medicine  Discharge Summary      Patient Name: Irasema Valverde  MRN: [id number]  Admission Date: 2/16/2019  Hospital Length of Stay: 0 days  Discharge Date and Time:  02/17/2019 11:46 AM  Attending Physician: Olga Madison MD   Discharging Provider: HEATH Zapata  Primary Care Provider: Corry Rao MD      HPI:   58 y.o. female obesity, hypertension, hyperlipidemia, dm 2, hypothyroidism, chronic diastolic heart failure, asthma, tracheostomy in place, glossopharyngeal neuralgia, and anemia presents to the ED with complaints cough productive of green sputum, chills, and generalized myalgias.  She also endorses chest pain that was gradual in onset, located throughout the upper chest, described as soreness, exacerbated by coughing, relieved with rest.  She denies fever, SOB, palpitations, dizziness, syncope, nausea, vomiting, diarrhea, abdominal pain, bloody or black stools, dysuria, frequency, or urgency.  She does not have CAD nor did she see a cardiologist.  Echo and stress test 2017 revealed normal myocardial perfusion and preserved EF, only mild diastolic dysfunction.  In the ED she was found to be hyperglycemic without evidence of DKA for which she received normal saline bolus and IV insulin.  Routine labs otherwise unremarkable, she is without fever or leukocytosis, rapid flu swab negative, chest x-ray without focal consolidation or opacity or other acute cardiopulmonary process, urinalysis without evidence of infection.  There was concern in the ED about the patient's chest pain. EKG without evidence of acute ischemia and unchanged from prior in 2017, initial troponin negative.  Placed in observation for ACS rule out.    * No surgery found *      Hospital Course:   58-year-old patient with a trach collar presents with persistent productive cough (green phlegm), with associated shortness of breath, and orthopnea.  Patient reports 2-3 days of coughing and pleuritic  chest pain. She ruled out ACS with serial negative troponins, and nonischemic EKG.  She reports recent sick contact her  who is at the bedside was diagnosed with pneumonia recently.  Patient remained afebrile without leukocytosis, noted to have mild dehydration, and elevated TSH = 36.574, which is lower than her previous in 2012 she presented with over 56.  She tells me she has been taking her medication together in the morning; patient was instructed and educated on correct administration of Synthroid she verbalizes understanding, she tells me that she takes 300 mcg a day; she has been instructed to take this medication at night along with a simple water before bed.  That way she does not accidentally missed a dose or the medication cannot interfere with her other medication administration.  For her green sputum patient will be prescribed antibiotics, will use broad-spectrum secondary to patient's increased risk of infection due to trach/diabetes.  She is in otherwise good spirits, her oxygen sat remains over 97% at rest on room air, her vitals are grossly normal, and she is anxious for discharge.     Consults:     No new Assessment & Plan notes have been filed under this hospital service since the last note was generated.  Service: Hospital Medicine    Final Active Diagnoses:    Diagnosis Date Noted POA    PRINCIPAL PROBLEM:  Atypical chest pain [R07.89] 05/18/2015 Yes    Essential hypertension [I10] 02/16/2019 Yes     Chronic    High cholesterol [E78.00] 02/16/2019 Yes     Chronic    Tracheostomy in place [Z93.0] 02/16/2019 Not Applicable     Chronic    Chronic diastolic congestive heart failure [I50.32] 02/16/2019 Yes     Chronic    Hypothyroid [E03.9] 02/16/2019 Yes     Chronic    Anemia [D64.9] 02/16/2019 Yes     Chronic    Type 2 diabetes mellitus with hyperglycemia, without long-term current use of insulin [E11.65] 02/16/2019 Yes     Chronic      Problems Resolved During this Admission:        Discharged Condition: stable    [place] [facility]    Follow Up:  Follow-up Information     Corry Rao MD In 1 week.    Specialty:  Internal Medicine  Why:  Call the office to schedule appointment, For outpatient follow-up/post hospitalizaton  Contact information:  [address]  [address]  [place] [address]  [phone]                 Patient Instructions:      Diet Cardiac     Activity as tolerated       Significant Diagnostic Studies: Labs:   CMP   Recent Labs   Lab 02/17/19  0500      K 3.6   CL 99   CO2 28   *   BUN 16   CREATININE 1.3   CALCIUM 8.5*   PROT 7.1   ALBUMIN 3.2*   BILITOT 0.5   ALKPHOS 90   AST 11   ALT 15   ANIONGAP 12   ESTGFRAFRICA 53*   EGFRNONAA 46*   , CBC   Recent Labs   Lab 02/17/19  0500   WBC 4.27   HGB 11.0*   HCT 34.7*   *   , INR   Lab Results   Component Value Date    INR 1.0 05/18/2015    INR 1.0 02/01/2013    INR 1.0 08/27/2012   , Lipid Panel No results found for: CHOL, HDL, LDLCALC, TRIG, CHOLHDL, Troponin   Recent Labs   Lab 02/16/19  2243   TROPONINI <0.006    and A1C: No results for input(s): HGBA1C in the last 4320 hours.    Pending Diagnostic Studies:     Procedure Component Value Units Date/Time    Hemoglobin A1c if not done in past 3 months [[id number]] Collected:  02/16/19 1640    Order Status:  Sent Lab Status:  In process Updated:  02/16/19 1640    Specimen:  Blood     Transthoracic echo (TTE) complete (Cupid Only) [[id number]]     Order Status:  Sent Lab Status:  No result          Medications:  Reconciled Home Medications:      Medication List      START taking these medications    levoFLOXacin 250 mg/10 mL Soln  Commonly known as:  LEVAQUIN  Take 20 mLs (500 mg total) by mouth once daily. May use tablets if this is not authorized by insurance - same dose for 4 days        CHANGE how you take these medications    levothyroxine 200 MCG tablet  Commonly known as:  SYNTHROID  Take 1.5 tablets (300 mcg total) by mouth  every evening.  What changed:    · how much to take  · when to take this        CONTINUE taking these medications    acetaminophen 325 MG tablet  Commonly known as:  TYLENOL  Take 2 tablets (650 mg total) by mouth every 4 to 6 hours as needed for Pain.     ADVAIR DISKUS 250-50 mcg/dose diskus inhaler  Generic drug:  fluticasone-salmeterol 250-50 mcg/dose  Inhale 1 puff into the lungs 2 (two) times daily.     albuterol 90 mcg/actuation inhaler  Commonly known as:  PROVENTIL/VENTOLIN HFA  Inhale 2 puffs into the lungs every 4 (four) hours as needed for Wheezing. Rescue  Use with spacer  DiSpence with one spacer     atorvastatin 80 MG tablet  Commonly known as:  LIPITOR  Take 80 mg by mouth once daily.     carbamide peroxide 6.5 % otic solution  Commonly known as:  DEBROX  Place 5 drops into both ears as needed.     docusate sodium 100 MG capsule  Commonly known as:  COLACE  Take 100 mg by mouth 2 (two) times daily.     furosemide 80 MG tablet  Commonly known as:  LASIX  Take 20 mg by mouth every 12 (twelve) hours.     ibuprofen 800 MG tablet  Commonly known as:  ADVIL,MOTRIN  Take 1 tablet (800 mg total) by mouth every 6 (six) hours as needed for Pain.     lisinopril 10 MG tablet  Take 10 mg by mouth once daily.     metoprolol succinate 25 MG 24 hr tablet  Commonly known as:  TOPROL-XL  Take 25 mg by mouth once daily.     naproxen 500 MG tablet  Commonly known as:  NAPROSYN  Take 1 tablet (500 mg total) by mouth 2 (two) times daily with meals.     neomycin-polymyxin-hydrocortisone 3.5-10,000-1 mg/mL-unit/mL-% otic suspension  Commonly known as:  CORTISPORIN  Please place 4 drops into the right ear every 6 hours, 4 times a day     pantoprazole 40 MG tablet  Commonly known as:  PROTONIX  Take 1 tablet (40 mg total) by mouth once daily.        STOP taking these medications    carvedilol 3.125 MG tablet  Commonly known as:  COREG     diazePAM 5 MG tablet  Commonly known as:  VALIUM     dicyclomine 20 mg tablet  Commonly  known as:  BENTYL            Indwelling Lines/Drains at time of discharge:   Lines/Drains/Airways     Airway                 Surgical Airway -- days                Time spent on the discharge of patient: 35 minutes  Patient was seen and examined on the date of discharge and determined to be suitable for discharge.          DOLORES Dykes, FNP-C  Hospitalist - Department of Hospital Medicine  [address] [place] [address]  Office [phone]; Pager [phone]

## 2019-02-17 NOTE — NURSING
Patient resting in bed quietly. NAD noted. No c/o pain.   Fall and safety precautions maintained. Bed alarm activated and audible. Bed locked in the lowest position, with side rails up x 2. Call bell and personal items within reach.

## 2019-02-17 NOTE — NURSING
Patient claimed she was using oxygen via tracheal mask at home. Oxygen saturation has been within normal since last night. Denies SOB and difficulty of breathing. Bedside hand off given to DIANNA Blanco. Denies needs.

## 2019-02-17 NOTE — PLAN OF CARE
02/17/19 0942   Discharge Assessment   Assessment Type Discharge Planning Assessment   Confirmed/corrected address and phone number on facesheet? Yes   Assessment information obtained from? Patient   Communicated expected length of stay with patient/caregiver yes   Prior to hospitilization cognitive status: Alert/Oriented   Prior to hospitalization functional status: Independent;Assistive Equipment;Needs Assistance  (has Trac; reportedly has been needed more assist at home; friend assists; desires walker on discharge)   Current cognitive status: Alert/Oriented   Current Functional Status: Assistive Equipment;Independent;Needs Assistance  (has Trac; reportedly has been needed more assist at home; friend assists; desires walker on discharge; also desires HH-SN if possible; PCA application and information provided)   Facility Arrived From: Home   Lives With alone   Able to Return to Prior Arrangements yes   Is patient able to care for self after discharge? Yes  (With friend's assist and rolling walker if quailfies)   Who are your caregiver(s) and their phone number(s)? Shaquille-son: [phone]; friend-Correa: [phone]   Patient's perception of discharge disposition home or selfcare  (Possible HH and rolling walker)   Readmission Within the Last 30 Days no previous admission in last 30 days   Patient currently being followed by outpatient case management? No   Patient currently receives any other outside agency services? No   Equipment Currently Used at Home other (see comments)  (Trac)   Do you have any problems affording any of your prescribed medications? No   Is the patient taking medications as prescribed? yes   Does the patient have transportation home? Yes   Transportation Anticipated family or friend will provide;health plan transportation  (Medicaid transport)   Discharge Plan A Home   Discharge Plan B (TBD)   DME Needed Upon Discharge  other (see comments)  (Possible walker)   Patient/Family in Agreement with  Plan yes   SW Role explained to patient; two patient identifiers recognized; SW contact information placed on Communication board. Friend-Correa will be helping patient at home with recovery.    Patient is mostly independent at home; friend assists as needed; has been needed more assist lately; no current services; has trac; desires HH-SN and rolling walker for discharge; also provided [facility] application provided as requested by patient.    PCP: Corry Rao MD [address] [place] [address] [address] / [place] [id number]  Prefers morning appts.    Extended Emergency Contact Information  Primary Emergency Contact: Shaquille Ramos   [facility]  Home Phone: [phone]  Relation: [facility] - [place], [place] - [address]  [address]  [facility] [address]  Phone: [phone] Fax: [phone]    [facility] [employer] [facility] [address] [place] [address] EXP AT [address]  [place] [address]  Phone: [phone] Fax: [phone]    Payor: MEDICAID / Plan: [place] / Product Type: Managed Medicaid /

## 2019-02-17 NOTE — PLAN OF CARE
02/17/19 1226   Final Note   Assessment Type Final Discharge Note   Anticipated Discharge Disposition Home-Health  ([facility] accepted)   What phone number can be called within the next 1-3 days to see how you are doing after discharge? ([phone] )   Hospital Follow Up  Appt(s) scheduled? No  (Unable)   Discharge plans and expectations educations in teach back method with documentation complete? Yes   Right Care Referral Info   Post Acute Recommendation Home-care   Referral Type Home Health   Facility Name [facility] [address]   [place], [place]

## 2019-02-17 NOTE — PLAN OF CARE
Problem: Diabetes Comorbidity  Goal: Blood Glucose Level Within Desired Range  Outcome: Ongoing (interventions implemented as appropriate)  Intervention: Maintain Glycemic Control   02/17/19 1301   Monitor and Manage Ketoacidosis   Glycemic Management blood glucose monitoring;supplemental insulin given         Problem: Pain Acute  Goal: Optimal Pain Control  Outcome: Ongoing (interventions implemented as appropriate)  Intervention: Develop Pain Management Plan   02/17/19 1301   Prevent or Manage Pain   Pain Management Interventions pillow support provided

## 2019-02-17 NOTE — NURSING
Bedside hand off received from DIANNA Blanco.  Patient is awake,conscious and oriented x 4. Breathing spontaneously in room air. With tracheostomy tube in place intact. Safety precautions maintained. Will continue to monitor.

## 2019-02-17 NOTE — PLAN OF CARE
[facility]    HOME HEALTH ORDERS  FACE TO FACE ENCOUNTER    Patient Name: Irasema Valverde  YOB: 1960    PCP: Corry Rao MD   PCP Address: [facility] / KATHY DOVE  PCP Phone Number: [phone]  PCP Fax: [phone]       Encounter Date: 02/17/2019    Admit to Home Health    Diagnoses:  Active Hospital Problems    Diagnosis  POA    *Atypical chest pain [R07.89]  Yes    Essential hypertension [I10]  Yes     Chronic    High cholesterol [E78.00]  Yes     Chronic    Tracheostomy in place [Z93.0]  Not Applicable     Chronic    Chronic diastolic congestive heart failure [I50.32]  Yes     Chronic    Hypothyroid [E03.9]  Yes     Chronic    Anemia [D64.9]  Yes     Chronic    Type 2 diabetes mellitus with hyperglycemia, without long-term current use of insulin [E11.65]  Yes     Chronic      Resolved Hospital Problems   No resolved problems to display.       No future appointments.  Follow-up Information     Corry Rao MD In 1 week.    Specialty:  Internal Medicine  Why:  Call the office to schedule appointment, For outpatient follow-up/post hospitalizaton  Contact information:  [address] [place] [address]  [address]  [address]  [phone]                     I have seen and examined this patient face to face today. My clinical findings that support the need for the home health skilled services and home bound status are the following:  Weakness/numbness causing balance and gait disturbance due to Infection, Weakness/Debility and Dehydration making it taxing to leave home.  Patient with medication mismanagement issues requiring home bound status as evidenced by  Poor understanding of medication regimen/dosage and Poor adherence to medication regimen/dosage.  Medical restrictions requiring assistance of another human to leave home due to  Decreased range of motions in extremities, Home oxygen requirement and home oxygen only prn; recent respiratory  infection; grossly abnormal thyroid.    Allergies:  Review of patient's allergies indicates:   Allergen Reactions    Codeine Itching       Diet: cardiac diet    Activities: activity as tolerated    Nursing:   SN to complete comprehensive assessment including routine vital signs. Instruct on disease process and s/s of complications to report to MD. Review/verify medication list sent home with the patient at time of discharge  and instruct patient/caregiver as needed. Frequency may be adjusted depending on start of care date.    Notify MD if SBP > 160 or < 90; DBP > 90 or < 50; HR > 120 or < 50; Temp > 101      CONSULTS:    Physical Therapy to evaluate and treat. Evaluate for home safety and equipment needs; Establish/upgrade home exercise program. Perform / instruct on therapeutic exercises, gait training, transfer training, and Range of Motion.  Occupational Therapy to evaluate and treat. Evaluate home environment for safety and equipment needs. Perform/Instruct on transfers, ADL training, ROM, and therapeutic exercises.  Aide to provide assistance with personal care, ADLs, and vital signs.    MISCELLANEOUS CARE:  N/A    WOUND CARE ORDERS  n/a      Medications: Review discharge medications with patient and family and provide education.      Current Discharge Medication List      START taking these medications    Details   levoFLOXacin (LEVAQUIN) 250 mg/10 mL Soln Take 20 mLs (500 mg total) by mouth once daily. May use tablets if this is not authorized by insurance - same dose for 4 days  Qty: 80 mL, Refills: 0         CONTINUE these medications which have CHANGED    Details   levothyroxine (SYNTHROID) 200 MCG tablet Take 1.5 tablets (300 mcg total) by mouth every evening.  Qty: 90 tablet, Refills: 3         CONTINUE these medications which have NOT CHANGED    Details   acetaminophen (TYLENOL) 325 MG tablet Take 2 tablets (650 mg total) by mouth every 4 to 6 hours as needed for Pain.  Qty: 30 tablet, Refills: 0     Associated Diagnoses: Right hip pain; Cystitis      furosemide (LASIX) 80 MG tablet Take 20 mg by mouth every 12 (twelve) hours.       ibuprofen (ADVIL,MOTRIN) 800 MG tablet Take 1 tablet (800 mg total) by mouth every 6 (six) hours as needed for Pain.  Qty: 20 tablet, Refills: 0      lisinopril 10 MG tablet Take 10 mg by mouth once daily.      metoprolol succinate (TOPROL-XL) 25 MG 24 hr tablet Take 25 mg by mouth once daily.      naproxen (NAPROSYN) 500 MG tablet Take 1 tablet (500 mg total) by mouth 2 (two) times daily with meals.  Qty: 20 tablet, Refills: 0      albuterol 90 mcg/actuation inhaler Inhale 2 puffs into the lungs every 4 (four) hours as needed for Wheezing. Rescue  Use with spacer  DiSpence with one spacer  Qty: 1 Inhaler, Refills: 0      atorvastatin (LIPITOR) 80 MG tablet Take 80 mg by mouth once daily.        carbamide peroxide (DEBROX) 6.5 % otic solution Place 5 drops into both ears as needed.  Qty: 15 mL, Refills: 0      docusate sodium (COLACE) 100 MG capsule Take 100 mg by mouth 2 (two) times daily.        fluticasone-salmeterol 250-50 mcg/dose (ADVAIR DISKUS) 250-50 mcg/dose diskus inhaler Inhale 1 puff into the lungs 2 (two) times daily.        neomycin-polymyxin-hydrocortisone (CORTISPORIN) 3.5-10,000-1 mg-unit/mL-% otic suspension Please place 4 drops into the right ear every 6 hours, 4 times a day  Qty: 10 mL, Refills: 0      pantoprazole (PROTONIX) 40 MG tablet Take 1 tablet (40 mg total) by mouth once daily.  Qty: 10 tablet, Refills: 0         STOP taking these medications       carvedilol (COREG) 3.125 MG tablet Comments:   Reason for Stopping:         diazepam (VALIUM) 5 MG tablet Comments:   Reason for Stopping:         dicyclomine (BENTYL) 20 mg tablet Comments:   Reason for Stopping:         dicyclomine (BENTYL) 20 mg tablet Comments:   Reason for Stopping:                I certify that this patient is confined to her home and needs intermittent skilled nursing care +  DOLORES Salcedo, FNP-C  Hospitalist - Department of Hospital Medicine  [address] [address]  Office [phone]; Pager [phone]  .

## 2019-02-17 NOTE — PLAN OF CARE
02/17/19 1229   Post-Acute Status   Post-Acute Authorization ([facility])

## 2019-06-11 ENCOUNTER — HOSPITAL ENCOUNTER (EMERGENCY)
Facility: HOSPITAL | Age: 59
Discharge: ANOTHER HOSPITAL | End: 2019-06-11
Attending: EMERGENCY MEDICINE
Payer: MEDICAID

## 2019-06-11 VITALS
DIASTOLIC BLOOD PRESSURE: 99 MMHG | SYSTOLIC BLOOD PRESSURE: 186 MMHG | WEIGHT: 250 LBS | RESPIRATION RATE: 23 BRPM | HEIGHT: 62 IN | HEART RATE: 87 BPM | BODY MASS INDEX: 46.01 KG/M2 | OXYGEN SATURATION: 98 % | TEMPERATURE: 98 F

## 2019-06-11 DIAGNOSIS — N28.9 ACUTE ON CHRONIC RENAL INSUFFICIENCY: ICD-10-CM

## 2019-06-11 DIAGNOSIS — N18.9 ACUTE ON CHRONIC RENAL INSUFFICIENCY: ICD-10-CM

## 2019-06-11 DIAGNOSIS — N39.0 ACUTE URINARY TRACT INFECTION: ICD-10-CM

## 2019-06-11 DIAGNOSIS — I31.39 PERICARDIAL EFFUSION: Primary | ICD-10-CM

## 2019-06-11 DIAGNOSIS — I10 HYPERTENSION: ICD-10-CM

## 2019-06-11 DIAGNOSIS — R10.9 FLANK PAIN: ICD-10-CM

## 2019-06-11 LAB
ALBUMIN SERPL-MCNC: 4.1 G/DL
ALP SERPL-CCNC: 63 U/L (ref 42–141)
BILIRUB SERPL-MCNC: 0.8 MG/DL (ref 0.2–1.6)
BILIRUBIN, POC UA: NEGATIVE
BLOOD, POC UA: ABNORMAL
BUN SERPL-MCNC: 13 MG/DL (ref 7–22)
CALCIUM SERPL-MCNC: 9.3 MG/DL (ref 8–10.3)
CHLORIDE SERPL-SCNC: 103 MMOL/L (ref 98–108)
CLARITY, POC UA: CLEAR
COLOR, POC UA: YELLOW
CREAT SERPL-MCNC: 1.4 MG/DL (ref 0.6–1.2)
GLUCOSE SERPL-MCNC: 120 MG/DL (ref 73–118)
GLUCOSE, POC UA: NEGATIVE
KETONES, POC UA: NEGATIVE
LEUKOCYTE EST, POC UA: ABNORMAL
NITRITE, POC UA: NEGATIVE
PH UR STRIP: 7 [PH]
POC ALT (SGPT): 36 U/L (ref 10–47)
POC AST (SGOT): 39 U/L (ref 11–38)
POC B-TYPE NATRIURETIC PEPTIDE: <5 PG/ML (ref 0–100)
POC CARDIAC TROPONIN I: 0.01 NG/ML
POC PTINR: 1 (ref 0.9–1.2)
POC PTWBT: 11.9 SEC (ref 9.7–14.3)
POC TCO2: 26 MMOL/L (ref 18–33)
POCT GLUCOSE: 112 MG/DL (ref 70–110)
POTASSIUM BLD-SCNC: 4.3 MMOL/L (ref 3.6–5.1)
PROTEIN, POC UA: ABNORMAL
PROTEIN, POC: 8.5 G/DL (ref 6.4–8.1)
SAMPLE: NORMAL
SAMPLE: NORMAL
SODIUM BLD-SCNC: 142 MMOL/L (ref 128–145)
SPECIFIC GRAVITY, POC UA: 1.02
UROBILINOGEN, POC UA: 1 E.U./DL

## 2019-06-11 PROCEDURE — 96376 TX/PRO/DX INJ SAME DRUG ADON: CPT | Mod: ER

## 2019-06-11 PROCEDURE — 85025 COMPLETE CBC W/AUTO DIFF WBC: CPT | Mod: ER

## 2019-06-11 PROCEDURE — 85610 PROTHROMBIN TIME: CPT | Mod: ER

## 2019-06-11 PROCEDURE — 93010 ELECTROCARDIOGRAM REPORT: CPT | Mod: ,,, | Performed by: INTERNAL MEDICINE

## 2019-06-11 PROCEDURE — 96375 TX/PRO/DX INJ NEW DRUG ADDON: CPT | Mod: ER

## 2019-06-11 PROCEDURE — 99291 CRITICAL CARE FIRST HOUR: CPT | Mod: 25,ER

## 2019-06-11 PROCEDURE — 93005 ELECTROCARDIOGRAM TRACING: CPT | Mod: ER

## 2019-06-11 PROCEDURE — 96361 HYDRATE IV INFUSION ADD-ON: CPT | Mod: ER

## 2019-06-11 PROCEDURE — 96374 THER/PROPH/DIAG INJ IV PUSH: CPT | Mod: ER

## 2019-06-11 PROCEDURE — 25000003 PHARM REV CODE 250: Mod: ER | Performed by: EMERGENCY MEDICINE

## 2019-06-11 PROCEDURE — 84484 ASSAY OF TROPONIN QUANT: CPT | Mod: ER

## 2019-06-11 PROCEDURE — 63600175 PHARM REV CODE 636 W HCPCS: Mod: ER | Performed by: EMERGENCY MEDICINE

## 2019-06-11 PROCEDURE — 81003 URINALYSIS AUTO W/O SCOPE: CPT | Mod: ER

## 2019-06-11 PROCEDURE — 93010 EKG 12-LEAD: ICD-10-PCS | Mod: ,,, | Performed by: INTERNAL MEDICINE

## 2019-06-11 PROCEDURE — 83880 ASSAY OF NATRIURETIC PEPTIDE: CPT | Mod: ER

## 2019-06-11 PROCEDURE — 80053 COMPREHEN METABOLIC PANEL: CPT | Mod: ER

## 2019-06-11 RX ORDER — CEFTRIAXONE 1 G/1
1 INJECTION, POWDER, FOR SOLUTION INTRAMUSCULAR; INTRAVENOUS
Status: COMPLETED | OUTPATIENT
Start: 2019-06-11 | End: 2019-06-11

## 2019-06-11 RX ORDER — FUROSEMIDE 10 MG/ML
40 INJECTION INTRAMUSCULAR; INTRAVENOUS
Status: COMPLETED | OUTPATIENT
Start: 2019-06-11 | End: 2019-06-11

## 2019-06-11 RX ORDER — KETOROLAC TROMETHAMINE 30 MG/ML
15 INJECTION, SOLUTION INTRAMUSCULAR; INTRAVENOUS
Status: COMPLETED | OUTPATIENT
Start: 2019-06-11 | End: 2019-06-11

## 2019-06-11 RX ADMIN — SODIUM CHLORIDE 1000 ML: 0.9 INJECTION, SOLUTION INTRAVENOUS at 12:06

## 2019-06-11 RX ADMIN — KETOROLAC TROMETHAMINE 15 MG: 30 INJECTION, SOLUTION INTRAMUSCULAR at 11:06

## 2019-06-11 RX ADMIN — FUROSEMIDE 40 MG: 10 INJECTION, SOLUTION INTRAMUSCULAR; INTRAVENOUS at 12:06

## 2019-06-11 RX ADMIN — CEFTRIAXONE SODIUM 1 G: 1 INJECTION, POWDER, FOR SOLUTION INTRAMUSCULAR; INTRAVENOUS at 11:06

## 2019-06-11 NOTE — ED PROVIDER NOTES
Encounter Date: 6/11/2019    SCRIBE #1 NOTE: I, Liss Graf, am scribing for, and in the presence of,  Dr. Brown . I have scribed the following portions of the note - Other sections scribed: HPI,ROS,PE .       History     Chief Complaint   Patient presents with    Back Pain     pt c/o loewr back pain x's 5 days with some blood in urine. Pt now also adds she has alot of mucus latley more than usual    Hematuria     57 y/o/f with a hx of trach, HTN, DM, and CHF presents with lower back pain for 5 days. Also complaining of hematuria and dysuria.  Symptoms are getting worse every day.  Patient also reports swelling to bilateral lower extremities.  Patient states she is taking her Lasix every day but the swelling is getting worse.  Denies fever.     The history is provided by the patient. No  was used.     Review of patient's allergies indicates:   Allergen Reactions    Codeine Itching     Past Medical History:   Diagnosis Date    Anemia     Arthritis     Asthma     CHF (congestive heart failure)     Diabetes mellitus     Ear infection     chronic    Glossopharyngeal neuralgia     High cholesterol     Hypertension     Hypothyroid     Lung disease     Migraine headache     Thyroid disease     Tracheostomy in place     Umbilical hernia     Vertigo      Past Surgical History:   Procedure Laterality Date    EXAM UNDER ANESTHESIA Left 4/30/2013    [facility] 2ND [facility]    HERNIA REPAIR      HYSTERECTOMY      REMOVAL, TYMPANOSTOMY TUBE Right 4/30/2013    [facility]    Right chronic otitiis media with mucocle      THYROID SURGERY      TRACHEAL SURGERY       Family History   Problem Relation Age of Onset    Hypertension Mother     Hyperlipidemia Mother     Hypertension Brother     Hyperlipidemia Brother     Diabetes Brother      Social History     Tobacco Use    Smoking status: Never Smoker    Substance Use Topics    Alcohol use: Yes     Comment: occasional    Drug use: No     Review of Systems   Constitutional: Negative for fever.   HENT: Negative for sore throat.    Respiratory: Negative for shortness of breath.    Cardiovascular: Positive for leg swelling. Negative for chest pain.   Gastrointestinal: Negative for nausea.   Genitourinary: Positive for dysuria and hematuria.   Musculoskeletal: Positive for back pain.   Skin: Negative for rash.   Neurological: Negative for weakness.   Hematological: Does not bruise/bleed easily.   All other systems reviewed and are negative.      Physical Exam     Initial Vitals [06/11/19 1037]   BP Pulse Resp Temp SpO2   (!) 163/87 82 18 98.9 °F (37.2 °C) 100 %      MAP       --         Physical Exam    Nursing note and vitals reviewed.  Constitutional: She appears well-developed and well-nourished.   HENT:   Head: Normocephalic and atraumatic.   Right Ear: External ear normal.   Left Ear: External ear normal.   Nose: Nose normal.   Mouth/Throat: Oropharynx is clear and moist.   Eyes: Conjunctivae are normal.   Neck: Normal range of motion. Neck supple. JVD present.   Trach in place.    Cardiovascular: Normal rate, regular rhythm and intact distal pulses. Exam reveals no gallop and no friction rub.    Murmur heard.  Pulmonary/Chest: Effort normal and breath sounds normal. No respiratory distress. She has no wheezes. She has no rhonchi. She has no rales.   Abdominal: Soft. Bowel sounds are normal. She exhibits no mass. There is no tenderness. There is CVA tenderness (left sided). There is no rebound and no guarding.   Musculoskeletal: Normal range of motion. She exhibits edema (to bilateral lower extremity).   Neurological: She is alert and oriented to person, place, and time. She has normal strength. No cranial nerve deficit or sensory deficit. GCS score is 15. GCS eye subscore is 4. GCS verbal subscore is 5. GCS motor subscore is 6.   Skin: Skin is warm and dry.  Capillary refill takes less than 2 seconds. No rash noted.   Psychiatric: She has a normal mood and affect. Her behavior is normal.       Patient gave consent to have physical exam performed.      ED Course   Critical Care  Date/Time: 6/11/2019 3:15 PM  Performed by: Lianna Brown DO  Authorized by: Lianna Brown DO   Direct patient critical care time: 8 minutes  Additional history critical care time: 8 minutes  Ordering / reviewing critical care time: 8 minutes  Documentation critical care time: 8 minutes  Consulting other physicians critical care time: 6 minutes  Total critical care time (exclusive of procedural time) : 38 minutes  Critical care was necessary to treat or prevent imminent or life-threatening deterioration of the following conditions: cardiac failure and metabolic crisis.  Critical care was time spent personally by me on the following activities: evaluation of patient's response to treatment, obtaining history from patient or surrogate, ordering and review of laboratory studies, pulse oximetry, review of old charts, examination of patient, ordering and performing treatments and interventions, ordering and review of radiographic studies and re-evaluation of patient's condition.        Labs Reviewed   POCT URINALYSIS W/O SCOPE - Abnormal; Notable for the following components:       Result Value    Glucose, UA Negative (*)     Bilirubin, UA Negative (*)     Ketones, UA Negative (*)     Blood, UA Trace-intact (*)     Protein, UA 2+ (*)     Nitrite, UA Negative (*)     Leukocytes, UA 1+ (*)     All other components within normal limits   POCT GLUCOSE - Abnormal; Notable for the following components:    POCT Glucose 112 (*)     All other components within normal limits   POCT CMP - Abnormal; Notable for the following components:    AST (SGOT), POC 39 (*)     POC Creatinine 1.4 (*)     POC Glucose 120 (*)     Protein 8.5 (*)     All other components within normal limits   POCT B-TYPE NATRIURETIC PEPTIDE  (BNP) - Abnormal; Notable for the following components:    POC B-Type Natriuretic Peptide <5.0 (*)     All other components within normal limits   TROPONIN ISTAT   POCT URINALYSIS W/O SCOPE   POCT CBC   POCT GLUCOSE, HAND-HELD DEVICE   POCT CMP   POCT PROTIME-INR   POCT TROPONIN   POCT B-TYPE NATRIURETIC PEPTIDE (BNP)   ISTAT PROCEDURE             EKG Readings: (Independently Interpreted)   Initial Reading: No STEMI. Rhythm: Normal Sinus Rhythm. Heart Rate: 74. Conduction: 1st Degree AV Block. Axis: Left Axis Deviation.   Abnormal EKG.     When compared to EKG on 2/16/19 rate has decreased by 9 bpm.       Imaging Results          CT Renal Stone Study ABD Pelvis WO (Final result)  Result time 06/11/19 12:25:03    Final result by Tab Okeefe MD (06/11/19 12:25:03)                 Impression:      1. No findings to suggest obstructive uropathy.  2. Pericardial effusion, correlation advised.  3. Hepatomegaly, correlation with LFTs recommended.  4. Colonic diverticulosis without diverticulitis.  5. Several additional findings above.      Electronically signed by: Tab Okeefe MD  Date:    06/11/2019  Time:    12:25             Narrative:    EXAMINATION:  CT RENAL STONE STUDY ABD PELVIS WO    CLINICAL HISTORY:  Flank pain, stone disease suspected;Hematuria;Pain abdominal unsp. (789.00);    TECHNIQUE:  Low dose axial images, sagittal and coronal reformations were obtained from the lung bases to the pubic symphysis.  Contrast was not administered.    COMPARISON:  09/30/2017    FINDINGS:  Images of the lower thorax are remarkable for bilateral dependent atelectasis.  There is pericardial effusion, partially visualized, appears increased in volume as compared to the previous exam.  There is a small hiatal hernia.    The liver is enlarged, correlation with LFTs recommended.  The spleen, pancreas, gallbladder and adrenal glands have a grossly unremarkable noncontrast appearance.  There is no biliary dilation or  ascites.  The pancreatic duct is not dilated.  There is haziness of the mesentery at the root, nonspecific, stable.    The kidneys have a grossly unremarkable noncontrast appearance without hydronephrosis or nephrolithiasis.  The bilateral ureters are unremarkable without calculi seen noting the ureters are unable to be followed in their entirety to the urinary bladder, no secondary findings to suggest obstructive uropathy.  The urinary bladder is decompressed.  The uterus is absent the adnexa is grossly unremarkable.    There are a few scattered colonic diverticula without inflammation.  The terminal ileum is unremarkable.  No pericecal inflammation noting the appendix is not confidently identified..  The small bowel is grossly unremarkable.  There are a few scattered mesenteric lymph nodes, nonenlarged.  There is atherosclerotic calcification of the aorta and its branches.  No focal organized pelvic fluid collection.    No focal osseous destructive process.  Degenerative changes are noted of the spine.  No significant inguinal lymphadenopathy.    Surgical change noted of the anterior abdominal wall noting diastasis of the rectus abdominus musculature and induration in the region, the appearance is similar to the previous exam.                                 Medical Decision Making:   Clinical Tests:   Lab Tests: Ordered and Reviewed  ED Management:  Treating with toradol. EKG ordered.       Differential diagnosis:  Back pain, urinary tract infection, pyelonephritis, hyperglycemia, electrolyte balance, STEMI, NSTEMI, and pericardial effusion  Treatment in the ED Physical Exam, Toradol, ceftriaxone, IV fluids, Lasix  Echo of the heart February 2019 shows no pericardial effusion  Discussed labs, and imaging results with the patient.    Discussed option of inpatient versus outpatient treatment.  Patient feels like the Lasix that she takes at home is not working and her legs are getting more and more swollen.  Patient  would prefer admission at [facility] for further management of her leg swelling and fluid around the heart.  Patient is requesting transfer to [facility] is at her primary care physician is.  Patient is agreeable to transfer & admission at [facility].    I spoke with Angeli at the [facility] called at 13:22.  Requesting consultation with Internal Medicine for admit and further evaluation of pericardial effusion.   Discussed patient's presentation, past medical history, physical exam, vital sign and ED course.  Consultation with DR Alvarenga for admission.  At this time patient will be transferred & admitted.      At time of transfer patient is awake alert oriented x4 speaking clearly in full sentences and moving all 4 extremities.            Scribe Attestation:   Scribe #1: I performed the above scribed service and the documentation accurately describes the services I performed. I attest to the accuracy of the note.     I, Dr. Lianna Brown, personally performed the services described in this documentation. This document was produced by a scribe under my direction and in my presence. All medical record entries made by the scribe were at my direction and in my presence.  I have reviewed the chart and agree that the record reflects my personal performance and is accurate and complete. Lianna Brown, .     06/11/2019 1:27 PM             Clinical Impression:     1. Pericardial effusion    2. Hypertension    3. Acute urinary tract infection    4. Flank pain    5. Acute on chronic renal insufficiency                                   Lianna Brown DO  06/11/19 1511

## 2019-06-13 LAB — POCT GLUCOSE: 112 MG/DL (ref 70–110)

## 2019-07-15 ENCOUNTER — HOSPITAL ENCOUNTER (EMERGENCY)
Facility: HOSPITAL | Age: 59
Discharge: HOME | End: 2019-07-15
Attending: EMERGENCY MEDICINE
Payer: MEDICAID

## 2019-07-15 VITALS
BODY MASS INDEX: 39.46 KG/M2 | OXYGEN SATURATION: 98 % | WEIGHT: 209 LBS | SYSTOLIC BLOOD PRESSURE: 186 MMHG | DIASTOLIC BLOOD PRESSURE: 79 MMHG | TEMPERATURE: 98 F | HEART RATE: 71 BPM | RESPIRATION RATE: 16 BRPM | HEIGHT: 61 IN

## 2019-07-15 DIAGNOSIS — K57.92 DIVERTICULITIS: Primary | ICD-10-CM

## 2019-07-15 DIAGNOSIS — I31.39 PERICARDIAL EFFUSION: ICD-10-CM

## 2019-07-15 DIAGNOSIS — R07.89 CHEST PAIN, ATYPICAL: ICD-10-CM

## 2019-07-15 LAB
ALBUMIN SERPL-MCNC: 3.9 G/DL (ref 3.3–5.5)
ALBUMIN SERPL-MCNC: 3.9 G/DL (ref 3.3–5.5)
ALP SERPL-CCNC: 72 U/L (ref 42–141)
ALP SERPL-CCNC: 73 U/L (ref 42–141)
ANISOCYTOSIS BLD QL SMEAR: SLIGHT
BASOPHILS # BLD AUTO: 0.01 K/UL (ref 0–0.2)
BASOPHILS NFR BLD: 0.2 % (ref 0–1.9)
BILIRUB SERPL-MCNC: 0.8 MG/DL (ref 0.2–1.6)
BILIRUB SERPL-MCNC: 0.8 MG/DL (ref 0.2–1.6)
BILIRUBIN, POC UA: NEGATIVE
BLOOD, POC UA: ABNORMAL
BUN SERPL-MCNC: 19 MG/DL (ref 7–22)
CALCIUM SERPL-MCNC: 8 MG/DL (ref 8–10.3)
CHLORIDE SERPL-SCNC: 102 MMOL/L (ref 98–108)
CLARITY, POC UA: CLEAR
COLOR, POC UA: YELLOW
CREAT SERPL-MCNC: 1.1 MG/DL (ref 0.6–1.2)
DIFFERENTIAL METHOD: ABNORMAL
EOSINOPHIL # BLD AUTO: 0.1 K/UL (ref 0–0.5)
EOSINOPHIL NFR BLD: 1 % (ref 0–8)
ERYTHROCYTE [DISTWIDTH] IN BLOOD BY AUTOMATED COUNT: 17.1 % (ref 11.5–14.5)
GLUCOSE SERPL-MCNC: 152 MG/DL (ref 73–118)
GLUCOSE, POC UA: NEGATIVE
HCT VFR BLD AUTO: 32.7 % (ref 37–48.5)
HGB BLD-MCNC: 10.5 G/DL (ref 12–16)
KETONES, POC UA: NEGATIVE
LEUKOCYTE EST, POC UA: NEGATIVE
LYMPHOCYTES # BLD AUTO: 1.9 K/UL (ref 1–4.8)
LYMPHOCYTES NFR BLD: 33.1 % (ref 18–48)
MCH RBC QN AUTO: 29.7 PG (ref 27–31)
MCHC RBC AUTO-ENTMCNC: 32.1 G/DL (ref 32–36)
MCV RBC AUTO: 93 FL (ref 82–98)
MONOCYTES # BLD AUTO: 0.2 K/UL (ref 0.3–1)
MONOCYTES NFR BLD: 2.9 % (ref 4–15)
NEUTROPHILS # BLD AUTO: 3.6 K/UL (ref 1.8–7.7)
NEUTROPHILS NFR BLD: 63.1 % (ref 38–73)
NITRITE, POC UA: NEGATIVE
PH UR STRIP: 6 [PH]
PLATELET # BLD AUTO: 155 K/UL (ref 150–350)
PLATELET BLD QL SMEAR: ABNORMAL
PMV BLD AUTO: 13.4 FL (ref 9.2–12.9)
POC ALT (SGPT): 21 U/L (ref 10–47)
POC ALT (SGPT): 24 U/L (ref 10–47)
POC AMYLASE: 30 U/L (ref 14–97)
POC AST (SGOT): 25 U/L (ref 11–38)
POC AST (SGOT): 25 U/L (ref 11–38)
POC B-TYPE NATRIURETIC PEPTIDE: 42.9 PG/ML (ref 0–100)
POC CARDIAC TROPONIN I: 0 NG/ML
POC GGT: 24 U/L (ref 5–65)
POC TCO2: 27 MMOL/L (ref 18–33)
POLYCHROMASIA BLD QL SMEAR: ABNORMAL
POTASSIUM BLD-SCNC: 3.5 MMOL/L (ref 3.6–5.1)
PROTEIN, POC UA: ABNORMAL
PROTEIN, POC: 8.7 G/DL (ref 6.4–8.1)
PROTEIN, POC: 8.8 G/DL (ref 6.4–8.1)
RBC # BLD AUTO: 3.53 M/UL (ref 4–5.4)
SAMPLE: NORMAL
SODIUM BLD-SCNC: 144 MMOL/L (ref 128–145)
SPECIFIC GRAVITY, POC UA: 1.02
T4 FREE SERPL-MCNC: 0.67 NG/DL (ref 0.71–1.51)
TSH SERPL DL<=0.005 MIU/L-ACNC: 11.72 UIU/ML (ref 0.4–4)
UROBILINOGEN, POC UA: 0.2 E.U./DL
WBC # BLD AUTO: 5.8 K/UL (ref 3.9–12.7)

## 2019-07-15 PROCEDURE — 82150 ASSAY OF AMYLASE: CPT | Mod: ER

## 2019-07-15 PROCEDURE — 25000242 PHARM REV CODE 250 ALT 637 W/ HCPCS: Mod: ER | Performed by: EMERGENCY MEDICINE

## 2019-07-15 PROCEDURE — 84439 ASSAY OF FREE THYROXINE: CPT

## 2019-07-15 PROCEDURE — 80053 COMPREHEN METABOLIC PANEL: CPT | Mod: ER

## 2019-07-15 PROCEDURE — 83880 ASSAY OF NATRIURETIC PEPTIDE: CPT | Mod: ER

## 2019-07-15 PROCEDURE — 96375 TX/PRO/DX INJ NEW DRUG ADDON: CPT | Mod: ER

## 2019-07-15 PROCEDURE — 93010 EKG 12-LEAD: ICD-10-PCS | Mod: ,,, | Performed by: INTERNAL MEDICINE

## 2019-07-15 PROCEDURE — 93005 ELECTROCARDIOGRAM TRACING: CPT | Mod: ER

## 2019-07-15 PROCEDURE — 84443 ASSAY THYROID STIM HORMONE: CPT

## 2019-07-15 PROCEDURE — 96361 HYDRATE IV INFUSION ADD-ON: CPT | Mod: ER

## 2019-07-15 PROCEDURE — 85025 COMPLETE CBC W/AUTO DIFF WBC: CPT | Mod: ER

## 2019-07-15 PROCEDURE — 84484 ASSAY OF TROPONIN QUANT: CPT | Mod: ER

## 2019-07-15 PROCEDURE — 99285 EMERGENCY DEPT VISIT HI MDM: CPT | Mod: 25,ER

## 2019-07-15 PROCEDURE — 85025 COMPLETE CBC W/AUTO DIFF WBC: CPT

## 2019-07-15 PROCEDURE — 93010 ELECTROCARDIOGRAM REPORT: CPT | Mod: ,,, | Performed by: INTERNAL MEDICINE

## 2019-07-15 PROCEDURE — 81003 URINALYSIS AUTO W/O SCOPE: CPT | Mod: ER

## 2019-07-15 PROCEDURE — 94640 AIRWAY INHALATION TREATMENT: CPT | Mod: ER

## 2019-07-15 PROCEDURE — 25000003 PHARM REV CODE 250: Mod: ER | Performed by: EMERGENCY MEDICINE

## 2019-07-15 PROCEDURE — 63600175 PHARM REV CODE 636 W HCPCS: Mod: ER | Performed by: EMERGENCY MEDICINE

## 2019-07-15 PROCEDURE — 96374 THER/PROPH/DIAG INJ IV PUSH: CPT | Mod: ER

## 2019-07-15 RX ORDER — IPRATROPIUM BROMIDE AND ALBUTEROL SULFATE 2.5; .5 MG/3ML; MG/3ML
3 SOLUTION RESPIRATORY (INHALATION) ONCE
Status: COMPLETED | OUTPATIENT
Start: 2019-07-15 | End: 2019-07-15

## 2019-07-15 RX ORDER — KETOROLAC TROMETHAMINE 30 MG/ML
15 INJECTION, SOLUTION INTRAMUSCULAR; INTRAVENOUS
Status: COMPLETED | OUTPATIENT
Start: 2019-07-15 | End: 2019-07-15

## 2019-07-15 RX ORDER — METRONIDAZOLE 250 MG/1
250 TABLET ORAL EVERY 8 HOURS
Qty: 21 TABLET | Refills: 0 | Status: SHIPPED | OUTPATIENT
Start: 2019-07-15 | End: 2019-07-22

## 2019-07-15 RX ORDER — ONDANSETRON 2 MG/ML
4 INJECTION INTRAMUSCULAR; INTRAVENOUS
Status: COMPLETED | OUTPATIENT
Start: 2019-07-15 | End: 2019-07-15

## 2019-07-15 RX ORDER — ONDANSETRON 4 MG/1
4 TABLET, ORALLY DISINTEGRATING ORAL EVERY 8 HOURS PRN
Qty: 15 TABLET | Refills: 0 | Status: SHIPPED | OUTPATIENT
Start: 2019-07-15 | End: 2019-07-20

## 2019-07-15 RX ADMIN — SODIUM CHLORIDE 1000 ML: 0.9 INJECTION, SOLUTION INTRAVENOUS at 08:07

## 2019-07-15 RX ADMIN — ONDANSETRON 4 MG: 2 INJECTION INTRAMUSCULAR; INTRAVENOUS at 08:07

## 2019-07-15 RX ADMIN — KETOROLAC TROMETHAMINE 15 MG: 30 INJECTION, SOLUTION INTRAMUSCULAR; INTRAVENOUS at 08:07

## 2019-07-15 RX ADMIN — IPRATROPIUM BROMIDE AND ALBUTEROL SULFATE 3 ML: .5; 3 SOLUTION RESPIRATORY (INHALATION) at 08:07

## 2019-07-15 NOTE — ED PROVIDER NOTES
EM PHYSICIAN NOTE    HPI  This patient presents with a complaint of   Chief Complaint   Patient presents with    Abdominal Pain     pt c/o llq abd pain and leg swelling since thursday. pt states regular bm this am. denies any fever. pt denies any sob. pt also c/o sided earache    Leg Swelling    Otalgia       HPI:  This is a 58-year-old woman with a history of CHF, asthma, hypertension, hypothyroidism and tracheostomy due to remote tracheal stenosis.  She presents to the emergency department today with several complaints. She is reporting that since Thursday she has had left lower abdominal pain and bilateral lower extremity edema. The low abdominal pain has been constant.  There has been no fevers or chills. She has had nausea but no vomiting. Last bowel movement was today and normal.  She also endorses intermittent chest pain, which is not exertional; as well as a cough which has been nonproductive.  She also reports that she has ear pain.    REVIEW of PMH, SOC History and Family History:  Past Medical History:   Diagnosis Date    Anemia     Arthritis     Asthma     CHF (congestive heart failure)     Diabetes mellitus     Ear infection     chronic    Glossopharyngeal neuralgia     High cholesterol     Hypertension     Hypothyroid     Lung disease     Migraine headache     Thyroid disease     Tracheostomy in place     Umbilical hernia     Vertigo      Past Surgical History:   Procedure Laterality Date    EXAM UNDER ANESTHESIA Left 4/30/2013    Performed [facility]    HERNIA REPAIR      HYSTERECTOMY      REMOVAL, TYMPANOSTOMY TUBE Right 4/30/2013    Performed by [facility] at [facility] OR [facility]    Right chronic otitiis media with mucocle      THYROID SURGERY      TRACHEAL SURGERY       Social History     Socioeconomic History    Marital status: Legally      Spouse name: Not on file    Number of children: Not on file    Years of education:  Not on file    Highest education level: Not on file   Occupational History    Not on file   Social Needs    Financial resource strain: Not on file    Food insecurity:     Worry: Not on file     Inability: Not on file    Transportation needs:     Medical: Not on file     Non-medical: Not on file   Tobacco Use    Smoking status: Never Smoker   Substance and Sexual Activity    Alcohol use: Yes     Comment: occasional    Drug use: No    Sexual activity: Not Currently   Lifestyle    Physical activity:     Days per week: Not on file     Minutes per session: Not on file    Stress: Not on file   Relationships    Social connections:     Talks on phone: Not on file     Gets together: Not on file     Attends Latter day service: Not on file     Active member of club or organization: Not on file     Attends meetings of clubs or organizations: Not on file     Relationship status: Not on file   Other Topics Concern    Not on file   Social History Narrative    Not on file     Patient Active Problem List   Diagnosis    Atypical chest pain    Essential hypertension    High cholesterol    Tracheostomy in place    Chronic diastolic congestive heart failure    Hypothyroid    Anemia    Type 2 diabetes mellitus with hyperglycemia, without long-term current use of insulin     No current facility-administered medications for this encounter.      Current Outpatient Medications   Medication Sig Dispense Refill    acetaminophen (TYLENOL) 325 MG tablet Take 2 tablets (650 mg total) by mouth every 4 to 6 hours as needed for Pain. 30 tablet 0    albuterol 90 mcg/actuation inhaler Inhale 2 puffs into the lungs every 4 (four) hours as needed for Wheezing. Rescue  Use with spacer  DiSpence with one spacer 1 Inhaler 0    atorvastatin (LIPITOR) 80 MG tablet Take 80 mg by mouth once daily.        carbamide peroxide (DEBROX) 6.5 % otic solution Place 5 drops into both ears as needed. 15 mL 0    docusate sodium (COLACE) 100 MG  capsule Take 100 mg by mouth 2 (two) times daily.        fluticasone-salmeterol 250-50 mcg/dose (ADVAIR DISKUS) 250-50 mcg/dose diskus inhaler Inhale 1 puff into the lungs 2 (two) times daily.        furosemide (LASIX) 80 MG tablet Take 20 mg by mouth every 12 (twelve) hours.       ibuprofen (ADVIL,MOTRIN) 800 MG tablet Take 1 tablet (800 mg total) by mouth every 6 (six) hours as needed for Pain. 20 tablet 0    levothyroxine (SYNTHROID) 200 MCG tablet Take 1.5 tablets (300 mcg total) by mouth every evening. 90 tablet 3    lisinopril 10 MG tablet Take 10 mg by mouth once daily.      metoprolol succinate (TOPROL-XL) 25 MG 24 hr tablet Take 25 mg by mouth once daily.      naproxen (NAPROSYN) 500 MG tablet Take 1 tablet (500 mg total) by mouth 2 (two) times daily with meals. 20 tablet 0    neomycin-polymyxin-hydrocortisone (CORTISPORIN) 3.5-10,000-1 mg-unit/mL-% otic suspension Please place 4 drops into the right ear every 6 hours, 4 times a day 10 mL 0    pantoprazole (PROTONIX) 40 MG tablet Take 1 tablet (40 mg total) by mouth once daily. 10 tablet 0     Review of patient's allergies indicates:   Allergen Reactions    Codeine Itching       There is no immunization history on file for this patient.  Family History   Problem Relation Age of Onset    Hypertension Mother     Hyperlipidemia Mother     Hypertension Brother     Hyperlipidemia Brother     Diabetes Brother        REVIEW of SYSTEMS  Source:  Patient   The nurse's notes and triage vital signs were reviewed.  GENERAL/CONSTITUTIONAL: There is no report of fever or unexplained weight loss.  Patient does report that she has had some fatigue.  CARDIOVASCULAR:  See HPI  RESPIRATORY: There is no report SOB  GASTROINTESTINAL: There is no report of vomiting, diarrhea  MUSCULOSKELETAL: There has been body aches. No muscle weakness or tenderness.  SKIN AND BREASTS: There is no report of easy bruising, skin redness, skin rash.  HEMATOLOGIC/LYMPHATIC: There is  "no report of anemia, bleeding or clotting defects. There is no report of anticoagulant use.  The remainder of the ROS is negative.    PHYSICAL EXAMINATION    ED Triage Vitals [07/15/19 0745]   Enc Vitals Group      BP (!) 196/91      Pulse 88      Resp 18      Temp 98.8 °F (37.1 °C)      Temp src Oral      SpO2 100 %      Weight 209 lb      Height 5' 1"      Head Circumference       Peak Flow       Pain Score       Pain Loc       Pain Edu?       Excl. in GC?      Vital signs and Pulse Ox reviewed in clinical context. Abnormalities noted: Hypertension noted and patient will be referred to primary care physician for close follow-up  Pt's level of consciousness is alert, and the patient is in mild distress.  Skin: warm, pink and dry  Mucosa:moist  Head and Neck:  Trach in place with no cellulitis or discharge at the stoma  Cardiac exam: RRR no murmur or rub  Pulmonary exam: unlabored and clear, but there is slight prolonged expiratory phase  Abd Exam: soft.  Tender to palpation in the left lower quadrant.  Musculoskeletal: no joint tenderness, deformity or swelling   Neurologic: GCS 15. 5 over 5 strength, normal gait, cranial nerves intact     Medical decision making: Nursing notes reviewed and incorporated  Impression:  Atypical chest pain, left lower quadrant abdominal pain, bronchospasm  Plan:  Labs, imaging, IV fluids, analgesics, nebs, reassess  Swati Olmos MD, 7:52 AM 7/15/2019    This note was created using Dictation Software.  This program may occasionally misinterpret certain words and phrases.                   Swati Olmos MD  07/15/19 0802      ED Course as of Jul 15 1042   Mon Jul 15, 2019   0813 My independent interpretation of the EKG is left axis deviation with no evidence of ST segment elevation infarct.  There is a loss of progression of voltage across the precordium.  EKG is similar to past EKGs.    [MH]   0814 Protein urea is present otherwise the UA is normal   Protein, UA(!): 2+ [MH] "   0903 Glucose elevated at 152 but no evidence of DKA    POC Glucose(!): 152 [MH]   0903 Amylase and liver function tests are normal.   Amylase, POC: 30 [MH]   0903 Troponin normal   POC Cardiac Troponin I: 0.00 [MH]   0904 No leukocytosis   Hemoglobin(!): 10.5 [MH]   0904 Hemoglobin(!): 10.5 [MH]   1040 Feeling better tolerating p.o.    [MH]   1041 Results of CT reviewed.  There is left lower quadrant inflammation which may be consistent with mild diverticulitis.  Patient is tolerating p.o. therefore I discharge her home on Flagyl.  Referring her back to her primary care physician and cardiology to follow her pericardial effusion. She has been diagnosed with a pericardial effusion in the past, and the reading today is similar to last month.   [MH]      ED Course User Index  [MH] Swati Olmos MD   No future appointments.    IMP:  Diverticulitis, pericardial effusion, mild anemia   Plan:  Referred to Cardiology and Primary Care, Flagyl and Zofran and bland diet  Swati Olmos MD  07/15/19 1042              Swati Olmos MD  07/15/19 1050

## 2019-07-15 NOTE — DISCHARGE INSTRUCTIONS
We did not find a medical condition that required inpatient admission at this time. It is important to remember that any patient's condition may change, and we cannot predict how you will be feeling tomorrow or the next day, so if you have any worsening or new symptoms, you should not hesitate to return to the emergency department for reevaluation.     It is important to remember that some medical conditions are difficult to diagnose and may not be identified during your first visit. Be sure to follow up with your primary care doctor and review any labs/imaging that was performed with them. If you do not have a primary care doctor, you may contact the one listed on your discharge paperwork or you may also call the [facility] Appointment Desk at [phone] to schedule an appointment with one.  It is also important to follow up with your primary care physician to determine that the condition that you are being treated for today has completely resolved and has been optimally managed.    All medications have side effects.  We have done our best to select a medication for you that will treat your health problem and have the least amount of side effects.  If at any time while or after you take this medication you develops new symptoms or have any concerns, it is important you stop taking the medication and return immediately to the emergency department for evaluation.    Return to the ER with any questions/concerns, new/concerning symptoms, worsening or failure to improve.     Do not drive or operate heavy machinery for 24 hours if you have received any pain medications, sedatives or mood altering drugs during your ER visit.

## 2019-09-13 ENCOUNTER — HOSPITAL ENCOUNTER (EMERGENCY)
Facility: HOSPITAL | Age: 59
Discharge: HOME | End: 2019-09-13
Attending: EMERGENCY MEDICINE
Payer: MEDICAID

## 2019-09-13 VITALS
SYSTOLIC BLOOD PRESSURE: 151 MMHG | HEIGHT: 62 IN | HEART RATE: 86 BPM | BODY MASS INDEX: 44.16 KG/M2 | RESPIRATION RATE: 20 BRPM | WEIGHT: 240 LBS | OXYGEN SATURATION: 100 % | TEMPERATURE: 97 F | DIASTOLIC BLOOD PRESSURE: 95 MMHG

## 2019-09-13 DIAGNOSIS — R05.9 COUGH: ICD-10-CM

## 2019-09-13 DIAGNOSIS — H66.91 ACUTE OTITIS MEDIA OF RIGHT EAR WITH PERFORATION: Primary | ICD-10-CM

## 2019-09-13 DIAGNOSIS — J40 BRONCHITIS: ICD-10-CM

## 2019-09-13 DIAGNOSIS — H72.91 ACUTE OTITIS MEDIA OF RIGHT EAR WITH PERFORATION: Primary | ICD-10-CM

## 2019-09-13 LAB
CTP QC/QA: YES
POC MOLECULAR INFLUENZA A AGN: NEGATIVE
POC MOLECULAR INFLUENZA B AGN: NEGATIVE
POCT GLUCOSE: 128 MG/DL (ref 70–110)

## 2019-09-13 PROCEDURE — 87804 INFLUENZA ASSAY W/OPTIC: CPT | Mod: ER

## 2019-09-13 PROCEDURE — 25000003 PHARM REV CODE 250: Mod: ER | Performed by: EMERGENCY MEDICINE

## 2019-09-13 PROCEDURE — 99283 EMERGENCY DEPT VISIT LOW MDM: CPT | Mod: 25,ER

## 2019-09-13 RX ORDER — NAPROXEN 250 MG/1
250 TABLET ORAL
Status: COMPLETED | OUTPATIENT
Start: 2019-09-13 | End: 2019-09-13

## 2019-09-13 RX ORDER — AMOXICILLIN AND CLAVULANATE POTASSIUM 875; 125 MG/1; MG/1
1 TABLET, FILM COATED ORAL 2 TIMES DAILY
Qty: 14 TABLET | Refills: 0 | OUTPATIENT
Start: 2019-09-13 | End: 2021-11-21

## 2019-09-13 RX ORDER — AMOXICILLIN AND CLAVULANATE POTASSIUM 875; 125 MG/1; MG/1
1 TABLET, FILM COATED ORAL
Status: COMPLETED | OUTPATIENT
Start: 2019-09-13 | End: 2019-09-13

## 2019-09-13 RX ADMIN — AMOXICILLIN AND CLAVULANATE POTASSIUM 1 TABLET: 875; 125 TABLET, FILM COATED ORAL at 07:09

## 2019-09-13 RX ADMIN — NAPROXEN 250 MG: 250 TABLET ORAL at 07:09

## 2019-09-13 NOTE — ED TRIAGE NOTES
"Reports cough, congestion, and right ear pain x1 week. Reports "chills." Unrelieved with albuterol; reports CBG was in 200's; .  "

## 2019-09-13 NOTE — ED PROVIDER NOTES
EM PHYSICIAN NOTE    HPI  This patient presents with a complaint of   Chief Complaint   Patient presents with    Otalgia     right ear pain for approx 1 week    Cough     with pain to right chest with the cough, for approx 1 week, states chest congestion       HPI:  This is a 59-year-old woman with a history of CHF, diabetes, tracheostomy and thyroid disease, who presents the emergency department with a complaint of nonproductive cough with pain in the right chest when coughing for the approximately a week.  Patient also reports right ear pain for approximately a week associated with drainage.  She has had a PE tube in that ear recently.    REVIEW of PMH, SOC History and Family History:  Past Medical History:   Diagnosis Date    Anemia     Arthritis     Asthma     CHF (congestive heart failure)     Diabetes mellitus     Ear infection     chronic    Glossopharyngeal neuralgia     High cholesterol     Hypertension     Hypothyroid     Lung disease     Migraine headache     Thyroid disease     Tracheostomy in place     Umbilical hernia     Vertigo      Past Surgical History:   Procedure Laterality Date    EXAM UNDER ANESTHESIA Left 4/30/2013    [facility]    HERNIA REPAIR      HYSTERECTOMY      REMOVAL, TYMPANOSTOMY TUBE Right 4/30/2013    Performed by [facility] at [facility] OR [address] [place] [address]    Right chronic otitiis media with mucocle      THYROID SURGERY      TRACHEAL SURGERY       Social History     Socioeconomic History    Marital status: Legally      Spouse name: Not on file    Number of children: Not on file    Years of education: Not on file    Highest education level: Not on file   Occupational History    Not on file   Social Needs    Financial resource strain: Not on file    Food insecurity:     Worry: Not on file     Inability: Not on file    Transportation needs:     Medical: Not on file     Non-medical: Not on file   Tobacco  Use    Smoking status: Never Smoker   Substance and Sexual Activity    Alcohol use: Yes     Comment: occasional    Drug use: No    Sexual activity: Not Currently   Lifestyle    Physical activity:     Days per week: Not on file     Minutes per session: Not on file    Stress: Not on file   Relationships    Social connections:     Talks on phone: Not on file     Gets together: Not on file     Attends Mandaen service: Not on file     Active member of club or organization: Not on file     Attends meetings of clubs or organizations: Not on file     Relationship status: Not on file   Other Topics Concern    Not on file   Social History Narrative    Not on file     Patient Active Problem List   Diagnosis    Atypical chest pain    Essential hypertension    High cholesterol    Tracheostomy in place    Chronic diastolic congestive heart failure    Hypothyroid    Anemia    Type 2 diabetes mellitus with hyperglycemia, without long-term current use of insulin     No current facility-administered medications for this encounter.      Current Outpatient Medications   Medication Sig Dispense Refill    acetaminophen (TYLENOL) 325 MG tablet Take 2 tablets (650 mg total) by mouth every 4 to 6 hours as needed for Pain. 30 tablet 0    albuterol 90 mcg/actuation inhaler Inhale 2 puffs into the lungs every 4 (four) hours as needed for Wheezing. Rescue  Use with spacer  DiSpence with one spacer 1 Inhaler 0    atorvastatin (LIPITOR) 80 MG tablet Take 80 mg by mouth once daily.        carbamide peroxide (DEBROX) 6.5 % otic solution Place 5 drops into both ears as needed. 15 mL 0    docusate sodium (COLACE) 100 MG capsule Take 100 mg by mouth 2 (two) times daily.        fluticasone-salmeterol 250-50 mcg/dose (ADVAIR DISKUS) 250-50 mcg/dose diskus inhaler Inhale 1 puff into the lungs 2 (two) times daily.        furosemide (LASIX) 80 MG tablet Take 20 mg by mouth every 12 (twelve) hours.       ibuprofen (ADVIL,MOTRIN)  800 MG tablet Take 1 tablet (800 mg total) by mouth every 6 (six) hours as needed for Pain. 20 tablet 0    levothyroxine (SYNTHROID) 200 MCG tablet Take 1.5 tablets (300 mcg total) by mouth every evening. 90 tablet 3    lisinopril 10 MG tablet Take 10 mg by mouth once daily.      metoprolol succinate (TOPROL-XL) 25 MG 24 hr tablet Take 25 mg by mouth once daily.      naproxen (NAPROSYN) 500 MG tablet Take 1 tablet (500 mg total) by mouth 2 (two) times daily with meals. 20 tablet 0    neomycin-polymyxin-hydrocortisone (CORTISPORIN) 3.5-10,000-1 mg-unit/mL-% otic suspension Please place 4 drops into the right ear every 6 hours, 4 times a day 10 mL 0    pantoprazole (PROTONIX) 40 MG tablet Take 1 tablet (40 mg total) by mouth once daily. 10 tablet 0    p  Review of patient's allergies indicates:   Allergen Reactions    Codeine Itching   erforated and has purulent drainage  Cardiac exam: RRR  Pulmonary exam: unlabored and clear  Abd Exam: soft nontender  Musculoskeletal: no joint tenderness, deformity or swelling   Neurologic: GCS 15. 5 over 5 strength, normal gait, cranial nerves intact, neck supple     Medical decision making: Nursing notes reviewed and incorporated  Impression:  Otitis media with perforation  Plan:  Antibiotics, follow up with ENT  Swati Olmos MD, 7:46 AM 9/13/2019    This note was created using Dictation Software.  This program may occasionally misinterpret certain words and phrases.           Swati Olmos MD  09/13/19 0810      Admission on 09/13/2019   Component Date Value Ref Range Status    POCT Glucose 09/13/2019 128* 70 - 110 mg/dL Final    POC Molecular Influenza A Ag 09/13/2019 Negative  Negative, Not Reported Final    POC Molecular Influenza B Ag 09/13/2019 Negative  Negative, Not Reported Final     Acceptable 09/13/2019 Yes   Final       Imaging Results          X-Ray Chest PA And Lateral (Final result)  Result time 09/13/19 08:02:52    Final result  by Maurilio Whitlock MD (09/13/19 08:02:52)                 Impression:      1. Cardiomegaly.  2. No acute pulmonary processes.      Electronically signed by: Maurilio Whitlock MD  Date:    09/13/2019  Time:    08:02             Narrative:    EXAMINATION:  XR CHEST PA AND LATERAL    CLINICAL HISTORY:  Cough    TECHNIQUE:  PA and lateral views of the chest were performed.    COMPARISON:  Chest 07/15/2019    FINDINGS:  The position of the tracheostomy in satisfactory position.  There is enlargement of the cardiac silhouette that remains without significant change.  The visualized portions of the trachea are unremarkable.    There is no lobar consolidations or pneumothorax or pulmonary vascular congestion or pleural effusion.  The visualized ribs demonstrate no significant abnormalities.                                Medications   naproxen tablet 250 mg (250 mg Oral Given 9/13/19 0759)   amoxicillin-clavulanate 875-125mg per tablet 1 tablet (1 tablet Oral Given 9/13/19 0759)       ED Course as of Sep 13 0818   Fri Sep 13, 2019   0807   glucose is 128    [MH]   0807 Chest x-ray does not reveal an infiltrate.   X-Ray Chest PA And Lateral [MH]   0817 Influenza negative    [MH]      ED Course User Index  [MH] Swait Olmos MD     Diagnoses that have been ruled out:   None   Diagnoses that are still under consideration:   None   Final diagnoses:   Cough   Acute otitis media of right ear with perforation   Bronchitis        Swati Olmos MD  09/13/19 0814      Swati Olmos MD  09/13/19 0818

## 2019-09-16 NOTE — ADDENDUM NOTE
Encounter addended by: Josey Chaidez on: 9/16/2019 1:25 PM   Actions taken: Charge Capture section accepted, Visit Navigator Flowsheet section accepted

## 2019-11-11 ENCOUNTER — HOSPITAL ENCOUNTER (EMERGENCY)
Facility: HOSPITAL | Age: 59
Discharge: HOME | End: 2019-11-11
Attending: EMERGENCY MEDICINE
Payer: MEDICAID

## 2019-11-11 VITALS
DIASTOLIC BLOOD PRESSURE: 78 MMHG | TEMPERATURE: 98 F | WEIGHT: 247 LBS | SYSTOLIC BLOOD PRESSURE: 120 MMHG | BODY MASS INDEX: 45.18 KG/M2 | RESPIRATION RATE: 20 BRPM | HEART RATE: 81 BPM | OXYGEN SATURATION: 95 %

## 2019-11-11 DIAGNOSIS — R06.02 SOB (SHORTNESS OF BREATH): ICD-10-CM

## 2019-11-11 DIAGNOSIS — H66.91 RIGHT OTITIS MEDIA, UNSPECIFIED OTITIS MEDIA TYPE: Primary | ICD-10-CM

## 2019-11-11 DIAGNOSIS — J06.9 UPPER RESPIRATORY TRACT INFECTION, UNSPECIFIED TYPE: ICD-10-CM

## 2019-11-11 LAB — POCT GLUCOSE: 200 MG/DL (ref 70–110)

## 2019-11-11 PROCEDURE — 93010 ELECTROCARDIOGRAM REPORT: CPT | Mod: ,,, | Performed by: INTERNAL MEDICINE

## 2019-11-11 PROCEDURE — 93005 ELECTROCARDIOGRAM TRACING: CPT | Mod: ER

## 2019-11-11 PROCEDURE — 82962 GLUCOSE BLOOD TEST: CPT | Mod: ER

## 2019-11-11 PROCEDURE — 93010 EKG 12-LEAD: ICD-10-PCS | Mod: ,,, | Performed by: INTERNAL MEDICINE

## 2019-11-11 PROCEDURE — 99284 EMERGENCY DEPT VISIT MOD MDM: CPT | Mod: 25,ER

## 2019-11-11 RX ORDER — INSULIN GLARGINE 100 [IU]/ML
40 INJECTION, SOLUTION SUBCUTANEOUS NIGHTLY
COMMUNITY
End: 2021-11-21 | Stop reason: SDUPTHER

## 2019-11-11 RX ORDER — TRIPROLIDINE/PSEUDOEPHEDRINE 2.5MG-60MG
600 TABLET ORAL EVERY 6 HOURS PRN
Qty: 473 ML | Refills: 0 | OUTPATIENT
Start: 2019-11-11 | End: 2021-11-21

## 2019-11-11 RX ORDER — AMOXICILLIN 500 MG/1
500 CAPSULE ORAL EVERY 12 HOURS
Qty: 14 CAPSULE | Refills: 0 | Status: SHIPPED | OUTPATIENT
Start: 2019-11-11 | End: 2019-11-18

## 2019-11-11 NOTE — ED PROVIDER NOTES
"Encounter Date: 11/11/2019    SCRIBE #1 NOTE: I, Jody Smith, am scribing for, and in the presence of,  ИРИНА Carias. I have scribed the following portions of the note - Other sections scribed: HPI, ROS, PE.       History     Chief Complaint   Patient presents with    URI     Pt states," Coughing for several days. When I cough my chest hurts. My right ear hurts also."     Irasema Valverde is a 59 y.o. female who presents to the ED complaining of cough with associated chest pain for several days. The pt reports the cp only occurs while she is coughing and the cough increases when she is laying down. She also reports pain in her right ear.    The history is provided by the patient.     Review of patient's allergies indicates:   Allergen Reactions    Codeine Itching     Past Medical History:   Diagnosis Date    Anemia     Arthritis     Asthma     CHF (congestive heart failure)     Diabetes mellitus     Ear infection     chronic    Glossopharyngeal neuralgia     High cholesterol     Hypertension     Hypothyroid     Lung disease     Migraine headache     Thyroid disease     Tracheostomy in place     Umbilical hernia     Vertigo      Past Surgical History:   Procedure Laterality Date    HERNIA REPAIR      HYSTERECTOMY      Right chronic otitiis media with mucocle      THYROID SURGERY      TRACHEAL SURGERY       Family History   Problem Relation Age of Onset    Hypertension Mother     Hyperlipidemia Mother     Hypertension Brother     Hyperlipidemia Brother     Diabetes Brother      Social History     Tobacco Use    Smoking status: Never Smoker    Smokeless tobacco: Never Used   Substance Use Topics    Alcohol use: Yes     Comment: occasional    Drug use: No     Review of Systems   Constitutional: Negative.    HENT: Positive for ear pain.    Eyes: Negative.    Respiratory: Positive for cough.    Cardiovascular: Positive for chest pain.   Gastrointestinal: Negative.    Endocrine: " Negative.    Genitourinary: Negative.    Musculoskeletal: Negative.    Skin: Negative.    Allergic/Immunologic: Negative.    Neurological: Negative.    Hematological: Negative.    Psychiatric/Behavioral: Negative.        Physical Exam     Initial Vitals [11/11/19 1326]   BP Pulse Resp Temp SpO2   129/87 92 16 98 °F (36.7 °C) 98 %      MAP       --         Physical Exam    Nursing note and vitals reviewed.  Constitutional: She appears well-developed and well-nourished.   HENT:   Head: Normocephalic and atraumatic.   Right Ear: Hearing normal.   Left Ear: Hearing normal.   Right ear is positive of tympanicostomy tube.   Eyes: Conjunctivae are normal.   Neck: Normal range of motion. Neck supple.   Positive tracheostomy. No drainage. No surrounding erythema.    Cardiovascular: Normal rate and intact distal pulses.   Pulmonary/Chest: Effort normal. No respiratory distress.   Musculoskeletal: Normal range of motion.   Neurological: She is alert and oriented to person, place, and time.   Skin: Skin is warm and dry.   Psychiatric: She has a normal mood and affect.         ED Course   Procedures  Labs Reviewed   POCT GLUCOSE - Abnormal; Notable for the following components:       Result Value    POCT Glucose 200 (*)     All other components within normal limits   POCT GLUCOSE MONITORING CONTINUOUS        ECG Results          EKG 12-lead (In process)  Result time 11/11/19 14:10:55    In process by Interface, Lab In [facility] (11/11/19 14:10:55)                 Narrative:    Test Reason : R06.02,    Vent. Rate : 092 BPM     Atrial Rate : 092 BPM     P-R Int : 186 ms          QRS Dur : 082 ms      QT Int : 364 ms       P-R-T Axes : 038 -10 007 degrees     QTc Int : 450 ms    Normal sinus rhythm  Voltage criteria for left ventricular hypertrophy  Nonspecific T wave abnormality  Abnormal ECG  When compared with ECG of 15-JUL-2019 08:09,  No significant change was found    Referred By: [email or web address]   SELF           Confirmed By:                              Imaging Results          X-Ray Chest PA And Lateral (Final result)  Result time 11/11/19 15:34:41    Final result by Qasim Perales MD (11/11/19 15:34:41)                 Impression:      Stable cardiomegaly.  No acute process.      Electronically signed by: Qasim Perales MD  Date:    11/11/2019  Time:    15:34             Narrative:    EXAMINATION:  XR CHEST PA AND LATERAL    CLINICAL HISTORY:  cough;    TECHNIQUE:  PA and lateral views of the chest were performed.    COMPARISON:  09/13/2019    FINDINGS:  Moderate cardiomegaly similar to prior exam.  Normal pulmonary vasculature.  Lungs are clear.  Stable tracheostomy.  Degenerative changes of the spine.                                 Medical Decision Making:   Initial Assessment:   Patient exam findings consistent with acute otitis media.  No mastoid tenderness to suggest mastoiditis at this time.  Will discharge patient home on Amoxil.  Patient is afebrile and nontoxic appearing.  Patient stable for discharge.   Clinical Tests:   Lab Tests: Ordered and Reviewed  Radiological Study: Ordered and Reviewed            Scribe Attestation:   Scribe #1: I performed the above scribed service and the documentation accurately describes the services I performed. I attest to the accuracy of the note.     Tracey Gaines PA-C                      Clinical Impression:     1. Right otitis media, unspecified otitis media type    2. SOB (shortness of breath)    3. Upper respiratory tract infection, unspecified type            Disposition:   Disposition: Discharged  Condition: Stable                     ИРИНА Monroe  11/12/19 0029

## 2020-01-12 ENCOUNTER — HOSPITAL ENCOUNTER (EMERGENCY)
Facility: HOSPITAL | Age: 60
Discharge: HOME | End: 2020-01-12
Attending: EMERGENCY MEDICINE
Payer: MEDICAID

## 2020-01-12 VITALS
WEIGHT: 251 LBS | DIASTOLIC BLOOD PRESSURE: 74 MMHG | BODY MASS INDEX: 46.19 KG/M2 | TEMPERATURE: 99 F | HEART RATE: 79 BPM | OXYGEN SATURATION: 96 % | HEIGHT: 62 IN | RESPIRATION RATE: 18 BRPM | SYSTOLIC BLOOD PRESSURE: 151 MMHG

## 2020-01-12 DIAGNOSIS — R73.9 HYPERGLYCEMIA: Primary | ICD-10-CM

## 2020-01-12 DIAGNOSIS — R05.9 COUGH: ICD-10-CM

## 2020-01-12 LAB
ALBUMIN SERPL-MCNC: 3.9 G/DL (ref 3.3–5.5)
ALP SERPL-CCNC: 83 U/L (ref 42–141)
BILIRUB SERPL-MCNC: 0.7 MG/DL (ref 0.2–1.6)
BILIRUBIN, POC UA: NEGATIVE
BLOOD, POC UA: ABNORMAL
BUN SERPL-MCNC: 22 MG/DL (ref 7–22)
CALCIUM SERPL-MCNC: 9.8 MG/DL (ref 8–10.3)
CHLORIDE SERPL-SCNC: 99 MMOL/L (ref 98–108)
CLARITY, POC UA: CLEAR
COLOR, POC UA: YELLOW
CREAT SERPL-MCNC: 1.2 MG/DL (ref 0.6–1.2)
CTP QC/QA: YES
CTP QC/QA: YES
GLUCOSE SERPL-MCNC: 490 MG/DL (ref 73–118)
GLUCOSE, POC UA: ABNORMAL
KETONES, POC UA: NEGATIVE
LEUKOCYTE EST, POC UA: NEGATIVE
NITRITE, POC UA: NEGATIVE
PH UR STRIP: 5.5 [PH]
POC ALT (SGPT): 20 U/L (ref 10–47)
POC AST (SGOT): 21 U/L (ref 11–38)
POC MOLECULAR INFLUENZA A AGN: NEGATIVE
POC MOLECULAR INFLUENZA B AGN: NEGATIVE
POC TCO2: 26 MMOL/L (ref 18–33)
POCT GLUCOSE: 241 MG/DL (ref 70–110)
POCT GLUCOSE: 326 MG/DL (ref 70–110)
POCT GLUCOSE: 361 MG/DL (ref 70–110)
POCT GLUCOSE: 446 MG/DL (ref 70–110)
POTASSIUM BLD-SCNC: 4.2 MMOL/L (ref 3.6–5.1)
PROTEIN, POC UA: ABNORMAL
PROTEIN, POC: 8.7 G/DL (ref 6.4–8.1)
S PYO RRNA THROAT QL PROBE: NEGATIVE
SODIUM BLD-SCNC: 142 MMOL/L (ref 128–145)
SPECIFIC GRAVITY, POC UA: 1.01
UROBILINOGEN, POC UA: 0.2 E.U./DL

## 2020-01-12 PROCEDURE — 87502 INFLUENZA DNA AMP PROBE: CPT | Mod: ER

## 2020-01-12 PROCEDURE — 81003 URINALYSIS AUTO W/O SCOPE: CPT | Mod: ER

## 2020-01-12 PROCEDURE — 87081 CULTURE SCREEN ONLY: CPT

## 2020-01-12 PROCEDURE — 85025 COMPLETE CBC W/AUTO DIFF WBC: CPT | Mod: ER

## 2020-01-12 PROCEDURE — 63600175 PHARM REV CODE 636 W HCPCS: Mod: ER | Performed by: PHYSICIAN ASSISTANT

## 2020-01-12 PROCEDURE — 93005 ELECTROCARDIOGRAM TRACING: CPT | Mod: ER

## 2020-01-12 PROCEDURE — 80053 COMPREHEN METABOLIC PANEL: CPT | Mod: ER

## 2020-01-12 PROCEDURE — 96376 TX/PRO/DX INJ SAME DRUG ADON: CPT | Mod: ER

## 2020-01-12 PROCEDURE — 99285 EMERGENCY DEPT VISIT HI MDM: CPT | Mod: 25,ER

## 2020-01-12 PROCEDURE — 87880 STREP A ASSAY W/OPTIC: CPT | Mod: ER

## 2020-01-12 PROCEDURE — 96374 THER/PROPH/DIAG INJ IV PUSH: CPT | Mod: ER

## 2020-01-12 PROCEDURE — 93010 EKG 12-LEAD: ICD-10-PCS | Mod: ,,, | Performed by: INTERNAL MEDICINE

## 2020-01-12 PROCEDURE — 93010 ELECTROCARDIOGRAM REPORT: CPT | Mod: ,,, | Performed by: INTERNAL MEDICINE

## 2020-01-12 PROCEDURE — 82962 GLUCOSE BLOOD TEST: CPT | Mod: ER

## 2020-01-12 PROCEDURE — 96361 HYDRATE IV INFUSION ADD-ON: CPT | Mod: ER

## 2020-01-12 RX ORDER — BENZONATATE 100 MG/1
100 CAPSULE ORAL 3 TIMES DAILY PRN
Qty: 20 CAPSULE | Refills: 0 | Status: SHIPPED | OUTPATIENT
Start: 2020-01-12 | End: 2020-01-22

## 2020-01-12 RX ADMIN — SODIUM CHLORIDE 1000 ML: 0.9 INJECTION, SOLUTION INTRAVENOUS at 08:01

## 2020-01-12 RX ADMIN — INSULIN HUMAN 2 UNITS: 100 INJECTION, SOLUTION PARENTERAL at 06:01

## 2020-01-12 RX ADMIN — INSULIN HUMAN 6 UNITS: 100 INJECTION, SOLUTION PARENTERAL at 07:01

## 2020-01-12 RX ADMIN — INSULIN HUMAN 4 UNITS: 100 INJECTION, SOLUTION PARENTERAL at 08:01

## 2020-01-12 RX ADMIN — SODIUM CHLORIDE 1000 ML: 0.9 INJECTION, SOLUTION INTRAVENOUS at 06:01

## 2020-01-12 NOTE — ED PROVIDER NOTES
Encounter Date: 1/12/2020    SCRIBE #1 NOTE: I, Joanne Galvin, am scribing for, and in the presence of,  ИРИНА Carias. I have scribed the following portions of the note - Other sections scribed: HPI, ROS, PE.       History     Chief Complaint   Patient presents with    Cough     Cough x 1 week, sore throat, chest pain with cough starting yesterday. Reports blood sugar has been elevated.      59 year old female complains of cough since yesterday. She notes chills and sore throat. She states her blood sugar has been elevated. Patient is insulin dependent. Upon arrival to ED glucose is 446.     The history is provided by the patient. No  was used.     Review of patient's allergies indicates:   Allergen Reactions    Codeine Itching     Past Medical History:   Diagnosis Date    Anemia     Arthritis     Asthma     CHF (congestive heart failure)     Diabetes mellitus     Ear infection     chronic    Glossopharyngeal neuralgia     High cholesterol     Hypertension     Hypothyroid     Lung disease     Migraine headache     Thyroid disease     Tracheostomy in place     Umbilical hernia     Vertigo      Past Surgical History:   Procedure Laterality Date    HERNIA REPAIR      HYSTERECTOMY      Right chronic otitiis media with mucocle      THYROID SURGERY      TRACHEAL SURGERY       Family History   Problem Relation Age of Onset    Hypertension Mother     Hyperlipidemia Mother     Hypertension Brother     Hyperlipidemia Brother     Diabetes Brother      Social History     Tobacco Use    Smoking status: Never Smoker    Smokeless tobacco: Never Used   Substance Use Topics    Alcohol use: Yes     Comment: occasional    Drug use: No     Review of Systems   Constitutional: Negative.  Negative for fever.   HENT: Positive for sore throat. Negative for congestion.    Eyes: Negative.    Respiratory: Positive for cough. Negative for shortness of breath.    Cardiovascular: Positive  for chest pain.   Gastrointestinal: Negative for nausea.   Endocrine: Negative.    Genitourinary: Negative.  Negative for dysuria.   Musculoskeletal: Negative.  Negative for back pain.   Skin: Negative.  Negative for rash.   Allergic/Immunologic: Negative.    Neurological: Negative.  Negative for weakness.   Hematological: Negative.  Does not bruise/bleed easily.   Psychiatric/Behavioral: Negative.    All other systems reviewed and are negative.      Physical Exam     Initial Vitals [01/12/20 1536]   BP Pulse Resp Temp SpO2   (!) 167/103 75 16 98.1 °F (36.7 °C) 97 %      MAP       --         Physical Exam    Nursing note and vitals reviewed.  Constitutional: She appears well-developed and well-nourished.   HENT:   Head: Normocephalic and atraumatic.   Right Ear: External ear normal.   Left Ear: External ear normal.   Eyes: Conjunctivae are normal.   Neck: Normal range of motion. Neck supple.   Cardiovascular: Normal rate, regular rhythm and intact distal pulses. Exam reveals no gallop and no friction rub.    No murmur heard.  Pulmonary/Chest: Effort normal and breath sounds normal. No respiratory distress. She has no wheezes. She has no rhonchi. She has no rales.   Musculoskeletal: Normal range of motion.   Neurological: She is alert and oriented to person, place, and time.   Skin: Skin is warm and dry.   Psychiatric: She has a normal mood and affect.         ED Course   Procedures  Labs Reviewed   POCT URINALYSIS W/O SCOPE - Abnormal; Notable for the following components:       Result Value    Glucose, UA 3+ (*)     Blood, UA 1+ (*)     Protein, UA 2+ (*)     All other components within normal limits   POCT GLUCOSE - Abnormal; Notable for the following components:    POCT Glucose 446 (*)     All other components within normal limits   POCT CMP - Abnormal; Notable for the following components:    POC Glucose 490 (*)     Protein 8.7 (*)     All other components within normal limits   POCT GLUCOSE - Abnormal; Notable  for the following components:    POCT Glucose 361 (*)     All other components within normal limits   POCT GLUCOSE - Abnormal; Notable for the following components:    POCT Glucose 326 (*)     All other components within normal limits   POCT GLUCOSE - Abnormal; Notable for the following components:    POCT Glucose 241 (*)     All other components within normal limits   CULTURE, STREP A,  THROAT   POCT INFLUENZA A/B MOLECULAR   POCT RAPID STREP A   POCT CBC   POCT GLUCOSE MONITORING CONTINUOUS   POCT CMP   POCT GLUCOSE MONITORING CONTINUOUS     EKG Readings: (Independently Interpreted)   Initial Reading: No STEMI. Previous EKG: Compared with most recent EKG Previous EKG Date: 11/11/2019 rate has decreased by 16 bpm today . Rhythm: Normal Sinus Rhythm. Heart Rate: 76 bpm. Axis: Left Axis Deviation. Other Impression: Abnormal EKG. Non speicifc t wave abnormalities.      ECG Results          EKG 12-lead (Final result)  Result time 01/13/20 23:11:58    Final result by Interface, Lab In [facility] (01/13/20 23:11:58)                 Narrative:    Test Reason : R05,    Vent. Rate : 076 BPM     Atrial Rate : 076 BPM     P-R Int : 172 ms          QRS Dur : 084 ms      QT Int : 384 ms       P-R-T Axes : 031 -09 197 degrees     QTc Int : 432 ms    Normal sinus rhythm  Possible Left atrial enlargement  LVH  Nonspecific T wave abnormality  Abnormal ECG  When compared with ECG of 11-NOV-2019 13:21,  No significant change was found  Confirmed by Margaret Chang MD ([id number]) on 1/13/2020 11:11:49 PM    Referred By: AAAREFERR   SELF           Confirmed By:Margaret Chang MD                            Imaging Results          X-Ray Chest PA And Lateral (Final result)  Result time 01/12/20 17:09:08    Final result by Ronal Toledo MD (01/12/20 17:09:08)                 Impression:      Satisfactory position of tracheostomy tube tip.    No airspace opacity.    Stable cardiomegaly without evidence of failure.      Electronically signed by: Ronal  MD Tre  Date:    01/12/2020  Time:    17:09             Narrative:    EXAMINATION:  XR CHEST PA AND LATERAL    CLINICAL HISTORY:  Cough    TECHNIQUE:  PA and lateral views of the chest were performed.    COMPARISON:  11/11/2019.    FINDINGS:  There is a tracheostomy tube with its tip at the level of the clavicular heads.  The remainder of the trachea is unremarkable.  There is unchanged grade blood enlargement of the cardiac silhouette.  The hemidiaphragms are within normal limits.  There is no evidence of free air beneath the hemidiaphragms.  There are no pleural effusions.  There is no evidence of a pneumothorax.  There is no evidence of pneumomediastinum.  No airspace opacity is present.  The osseous structures are unremarkable.  The subcutaneous tissues are unremarkable.                                 Medical Decision Making:   History:   Old Medical Records: I decided to obtain old medical records.  Initial Assessment:   A 59-year-old female with a past medical history of diabetes and tracheostomy presents complaining of cough since yesterday.  And patient also reports increased glucose due to new medication she is on.  Patient states she has been compliant with all of her medications.  She denies shortness of breath, chest pain, or fever.  CBC negative for leukocytosis.  Glucose 490.  Creatinine 1.2.  Potassium 4.2.  Patient's glucose improved with 2 L of fluids and 12 units of insulin IV.  EKG shows no evidence of acute STEMI.  Chest x-ray is negative for pneumonia.  I suspect patient has a viral URI.  Patient has no evidence of of DKA at this time.  Patient is stable for discharge.  Clinical Tests:   Lab Tests: Ordered and Reviewed  Medical Tests: Ordered and Reviewed            Scribe Attestation:   Scribe #1: I performed the above scribed service and the documentation accurately describes the services I performed. I attest to the accuracy of the note.    The document was produced by a scribe under my  direction and in my presence.  I agree with the content of the note and have made any necessary edits.    Tracey GIFFORD                       Clinical Impression:     1. Hyperglycemia    2. Cough                                ИРИНА Monroe  01/14/20 0004

## 2020-01-13 NOTE — ED NOTES
59 year old female to ED for cough w green sputum, generalized body aches, and sore throat x 2 days. Patient AAO x 4, GCS 15, in no apparent distress. Airway patent via trach collar.

## 2020-01-14 LAB — BACTERIA THROAT CULT: NORMAL

## 2021-02-19 ENCOUNTER — NURSE TRIAGE (OUTPATIENT)
Dept: ADMINISTRATIVE | Facility: CLINIC | Age: 61
End: 2021-02-19

## 2021-11-21 ENCOUNTER — HOSPITAL ENCOUNTER (EMERGENCY)
Facility: HOSPITAL | Age: 61
Discharge: HOME | End: 2021-11-21
Attending: EMERGENCY MEDICINE
Payer: MEDICAID

## 2021-11-21 VITALS
SYSTOLIC BLOOD PRESSURE: 132 MMHG | RESPIRATION RATE: 17 BRPM | WEIGHT: 234 LBS | BODY MASS INDEX: 43.06 KG/M2 | DIASTOLIC BLOOD PRESSURE: 66 MMHG | OXYGEN SATURATION: 97 % | HEART RATE: 72 BPM | HEIGHT: 62 IN | TEMPERATURE: 98 F

## 2021-11-21 DIAGNOSIS — H66.91 RIGHT OTITIS MEDIA, UNSPECIFIED OTITIS MEDIA TYPE: Primary | ICD-10-CM

## 2021-11-21 DIAGNOSIS — J30.9 ALLERGIC RHINITIS, UNSPECIFIED SEASONALITY, UNSPECIFIED TRIGGER: ICD-10-CM

## 2021-11-21 DIAGNOSIS — R73.9 HYPERGLYCEMIA: ICD-10-CM

## 2021-11-21 LAB
ALBUMIN SERPL-MCNC: 3.9 G/DL (ref 3.3–5.5)
ALLENS TEST: ABNORMAL
ALP SERPL-CCNC: 102 U/L (ref 42–141)
BILIRUB SERPL-MCNC: 0.8 MG/DL (ref 0.2–1.6)
BILIRUBIN, POC UA: NEGATIVE
BLOOD, POC UA: NEGATIVE
BUN SERPL-MCNC: 21 MG/DL (ref 7–22)
CALCIUM SERPL-MCNC: 9.7 MG/DL (ref 8–10.3)
CHLORIDE SERPL-SCNC: 96 MMOL/L (ref 98–108)
CLARITY, POC UA: CLEAR
COLOR, POC UA: YELLOW
CREAT SERPL-MCNC: 0.8 MG/DL (ref 0.6–1.2)
CTP QC/QA: YES
GLUCOSE SERPL-MCNC: 473 MG/DL (ref 73–118)
GLUCOSE, POC UA: ABNORMAL
HCO3 UR-SCNC: 28.6 MMOL/L (ref 24–28)
KETONES, POC UA: NEGATIVE
LDH SERPL L TO P-CCNC: 1.05 MMOL/L (ref 0.5–2.2)
LEUKOCYTE EST, POC UA: NEGATIVE
NITRITE, POC UA: NEGATIVE
PCO2 BLDA: 49.1 MMHG (ref 35–45)
PH SMN: 7.37 [PH] (ref 7.35–7.45)
PH UR STRIP: 6 [PH]
PO2 BLDA: 37 MMHG (ref 40–60)
POC ALT (SGPT): 14 U/L (ref 10–47)
POC AST (SGOT): 18 U/L (ref 11–38)
POC B-TYPE NATRIURETIC PEPTIDE: 35.9 PG/ML (ref 0–100)
POC BE: 3 MMOL/L
POC CARDIAC TROPONIN I: 0 NG/ML
POC PTINR: 1 (ref 0.9–1.2)
POC PTWBT: 12 SEC (ref 9.7–14.3)
POC SATURATED O2: 69 % (ref 95–100)
POC TCO2: 28 MMOL/L (ref 18–33)
POC TCO2: 30 MMOL/L (ref 24–29)
POCT GLUCOSE: 167 MG/DL (ref 70–110)
POCT GLUCOSE: 323 MG/DL (ref 70–110)
POCT GLUCOSE: >500 MG/DL (ref 70–110)
POTASSIUM BLD-SCNC: 4.9 MMOL/L (ref 3.6–5.1)
PROTEIN, POC UA: NEGATIVE
PROTEIN, POC: 7.9 G/DL (ref 6.4–8.1)
SAMPLE: ABNORMAL
SAMPLE: NORMAL
SAMPLE: NORMAL
SARS-COV-2 RDRP RESP QL NAA+PROBE: NEGATIVE
SITE: ABNORMAL
SODIUM BLD-SCNC: 139 MMOL/L (ref 128–145)
SPECIFIC GRAVITY, POC UA: 1.01
UROBILINOGEN, POC UA: 0.2 E.U./DL

## 2021-11-21 PROCEDURE — 85025 COMPLETE CBC W/AUTO DIFF WBC: CPT | Mod: ER

## 2021-11-21 PROCEDURE — 93010 EKG 12-LEAD: ICD-10-PCS | Mod: ,,, | Performed by: INTERNAL MEDICINE

## 2021-11-21 PROCEDURE — 93010 ELECTROCARDIOGRAM REPORT: CPT | Mod: ,,, | Performed by: INTERNAL MEDICINE

## 2021-11-21 PROCEDURE — 85610 PROTHROMBIN TIME: CPT | Mod: ER

## 2021-11-21 PROCEDURE — U0002 COVID-19 LAB TEST NON-CDC: HCPCS | Mod: ER | Performed by: NURSE PRACTITIONER

## 2021-11-21 PROCEDURE — 93005 ELECTROCARDIOGRAM TRACING: CPT | Mod: ER

## 2021-11-21 PROCEDURE — 96361 HYDRATE IV INFUSION ADD-ON: CPT | Mod: ER

## 2021-11-21 PROCEDURE — 96375 TX/PRO/DX INJ NEW DRUG ADDON: CPT | Mod: ER

## 2021-11-21 PROCEDURE — 96374 THER/PROPH/DIAG INJ IV PUSH: CPT | Mod: ER

## 2021-11-21 PROCEDURE — 99285 EMERGENCY DEPT VISIT HI MDM: CPT | Mod: 25,ER

## 2021-11-21 PROCEDURE — 81003 URINALYSIS AUTO W/O SCOPE: CPT | Mod: ER

## 2021-11-21 PROCEDURE — 84484 ASSAY OF TROPONIN QUANT: CPT | Mod: ER

## 2021-11-21 PROCEDURE — 25000003 PHARM REV CODE 250: Mod: ER | Performed by: NURSE PRACTITIONER

## 2021-11-21 PROCEDURE — 82803 BLOOD GASES ANY COMBINATION: CPT | Mod: ER

## 2021-11-21 PROCEDURE — 80053 COMPREHEN METABOLIC PANEL: CPT | Mod: ER

## 2021-11-21 PROCEDURE — 83880 ASSAY OF NATRIURETIC PEPTIDE: CPT | Mod: ER

## 2021-11-21 PROCEDURE — 82962 GLUCOSE BLOOD TEST: CPT | Mod: ER

## 2021-11-21 PROCEDURE — 96376 TX/PRO/DX INJ SAME DRUG ADON: CPT | Mod: ER

## 2021-11-21 PROCEDURE — 63600175 PHARM REV CODE 636 W HCPCS: Mod: ER | Performed by: NURSE PRACTITIONER

## 2021-11-21 RX ORDER — CETIRIZINE HYDROCHLORIDE 1 MG/ML
5 SOLUTION ORAL DAILY
Qty: 236 ML | Refills: 0 | Status: SHIPPED | OUTPATIENT
Start: 2021-11-21 | End: 2021-11-21 | Stop reason: CLARIF

## 2021-11-21 RX ORDER — ISOSORBIDE DINITRATE 30 MG/1
30 TABLET ORAL 2 TIMES DAILY
COMMUNITY
Start: 2021-03-13

## 2021-11-21 RX ORDER — AMOXICILLIN AND CLAVULANATE POTASSIUM 875; 125 MG/1; MG/1
1 TABLET, FILM COATED ORAL 2 TIMES DAILY
Qty: 20 TABLET | Refills: 0 | Status: SHIPPED | OUTPATIENT
Start: 2021-11-21 | End: 2021-12-01

## 2021-11-21 RX ORDER — METOPROLOL TARTRATE 100 MG/1
100 TABLET ORAL 2 TIMES DAILY
COMMUNITY
Start: 2021-03-13

## 2021-11-21 RX ORDER — METFORMIN HYDROCHLORIDE 1000 MG/1
1000 TABLET ORAL 2 TIMES DAILY
COMMUNITY
Start: 2021-03-13

## 2021-11-21 RX ORDER — SODIUM CHLORIDE 9 MG/ML
INJECTION, SOLUTION INTRAVENOUS
Status: COMPLETED | OUTPATIENT
Start: 2021-11-21 | End: 2021-11-21

## 2021-11-21 RX ORDER — FLUTICASONE PROPIONATE 50 MCG
1 SPRAY, SUSPENSION (ML) NASAL 2 TIMES DAILY PRN
Qty: 15 G | Refills: 0 | Status: SHIPPED | OUTPATIENT
Start: 2021-11-21 | End: 2022-07-24 | Stop reason: SDUPTHER

## 2021-11-21 RX ORDER — LEVOTHYROXINE SODIUM 300 UG/1
300 TABLET ORAL DAILY
COMMUNITY
Start: 2021-03-13

## 2021-11-21 RX ORDER — IBUPROFEN 600 MG/1
600 TABLET ORAL EVERY 6 HOURS PRN
Qty: 20 TABLET | Refills: 0 | Status: SHIPPED | OUTPATIENT
Start: 2021-11-21

## 2021-11-21 RX ORDER — DEXTROMETHORPHAN HYDROBROMIDE, GUAIFENESIN 5; 100 MG/5ML; MG/5ML
650 LIQUID ORAL EVERY 8 HOURS
Qty: 20 TABLET | Refills: 0 | Status: SHIPPED | OUTPATIENT
Start: 2021-11-21 | End: 2022-07-24 | Stop reason: SDUPTHER

## 2021-11-21 RX ORDER — LISINOPRIL 10 MG/1
10 TABLET ORAL DAILY
COMMUNITY
Start: 2021-03-13

## 2021-11-21 RX ORDER — KETOROLAC TROMETHAMINE 30 MG/ML
15 INJECTION, SOLUTION INTRAMUSCULAR; INTRAVENOUS
Status: COMPLETED | OUTPATIENT
Start: 2021-11-21 | End: 2021-11-21

## 2021-11-21 RX ORDER — CETIRIZINE HYDROCHLORIDE 10 MG/1
10 TABLET ORAL DAILY
Qty: 30 TABLET | Refills: 0 | Status: SHIPPED | OUTPATIENT
Start: 2021-11-21 | End: 2022-07-24 | Stop reason: SDUPTHER

## 2021-11-21 RX ORDER — INSULIN LISPRO 100 [IU]/ML
15 INJECTION, SOLUTION INTRAVENOUS; SUBCUTANEOUS 3 TIMES DAILY
COMMUNITY
Start: 2021-03-13

## 2021-11-21 RX ORDER — ATORVASTATIN CALCIUM 80 MG/1
TABLET, FILM COATED ORAL
COMMUNITY
Start: 2021-03-13

## 2021-11-21 RX ORDER — INSULIN GLARGINE 100 [IU]/ML
INJECTION, SOLUTION SUBCUTANEOUS
COMMUNITY
Start: 2021-03-13

## 2021-11-21 RX ORDER — PANTOPRAZOLE SODIUM 20 MG/1
40 TABLET, DELAYED RELEASE ORAL DAILY
COMMUNITY
Start: 2021-03-13

## 2021-11-21 RX ADMIN — SODIUM CHLORIDE: 0.9 INJECTION, SOLUTION INTRAVENOUS at 02:11

## 2021-11-21 RX ADMIN — KETOROLAC TROMETHAMINE 15 MG: 30 INJECTION, SOLUTION INTRAMUSCULAR; INTRAVENOUS at 02:11

## 2021-11-21 RX ADMIN — INSULIN HUMAN 6 UNITS: 100 INJECTION, SOLUTION PARENTERAL at 03:11

## 2021-11-21 RX ADMIN — INSULIN HUMAN 10 UNITS: 100 INJECTION, SOLUTION PARENTERAL at 02:11

## 2021-12-02 ENCOUNTER — TELEPHONE (OUTPATIENT)
Dept: GASTROENTEROLOGY | Facility: CLINIC | Age: 61
End: 2021-12-02
Payer: MEDICAID

## 2021-12-02 DIAGNOSIS — Z12.11 COLON CANCER SCREENING: Primary | ICD-10-CM

## 2022-01-28 ENCOUNTER — TELEPHONE (OUTPATIENT)
Dept: ENDOSCOPY | Facility: HOSPITAL | Age: 62
End: 2022-01-28
Payer: MEDICAID

## 2022-01-28 NOTE — TELEPHONE ENCOUNTER
Patient received televox call to schedule appointment. Patient is an outside referral to [facility] from [facility]. Patient states that she sees a Cardiologist at [facility] but doesn't know his name. Patient asked me to call her back around 12 noon so I could speak with her home health nurse to get more information. Home health nurse was able to give me the name of a doctor in cardiology at [facility] by the name of Brent Mckinney. I then called [facility] to get their fax number to request cardiology records.I was informed that the above doctor is not with [facility]. I was able  to give the patients name and  and was told patient was last seen by Dr. Atnonio Cabello in 2021, with a follow up in 6 months and that Dr. Cabello is no longer with [facility] and that patient should follow up with Dr. Benedict Hidalgo. Patient was called back to be informed of this information. She again wanted me to speak with her home health nurse. Home health nurse was given this information. Will follow up with patient periodically to check status of cardiology appointment, so that cardiac clearance can be requested and patient can be scheduled for colonoscopy.

## 2022-03-04 ENCOUNTER — TELEPHONE (OUTPATIENT)
Dept: ENDOSCOPY | Facility: HOSPITAL | Age: 62
End: 2022-03-04
Payer: MEDICAID

## 2022-03-04 NOTE — TELEPHONE ENCOUNTER
Cardiac clearance and Plavix hold requested from Dr. Benedict Hidalgo at [facility]. Phone # [phone], fax # [phone].

## 2022-04-28 ENCOUNTER — TELEPHONE (OUTPATIENT)
Dept: ENDOSCOPY | Facility: HOSPITAL | Age: 62
End: 2022-04-28
Payer: MEDICAID

## 2022-04-28 NOTE — TELEPHONE ENCOUNTER
Received response on cardiac clearance and Plavix hold that was requested for screening Colonoscopy. Per fax received on 4/27/22 from Dr. Hidalgo at [facility] patient had stents placed on 3/8/22 and will not be able to hold Plavix for 6 months to a year. Contacted patient to inform her of this. No answer. LVM in regards to this. Will continue to reach out to patient. Fax received from Dr. Hidalgo will be uploaded to media tab. Will reach out to Dr. Hidalgo on or around 9/8/22 (6 months) in regards to Plavix hold.

## 2022-07-24 ENCOUNTER — HOSPITAL ENCOUNTER (EMERGENCY)
Facility: HOSPITAL | Age: 62
Discharge: HOME | End: 2022-07-24
Attending: EMERGENCY MEDICINE
Payer: MEDICAID

## 2022-07-24 VITALS
SYSTOLIC BLOOD PRESSURE: 130 MMHG | TEMPERATURE: 99 F | WEIGHT: 236 LBS | HEART RATE: 70 BPM | OXYGEN SATURATION: 96 % | RESPIRATION RATE: 18 BRPM | DIASTOLIC BLOOD PRESSURE: 88 MMHG | BODY MASS INDEX: 43.16 KG/M2

## 2022-07-24 DIAGNOSIS — H66.93 BILATERAL OTITIS MEDIA, UNSPECIFIED OTITIS MEDIA TYPE: ICD-10-CM

## 2022-07-24 DIAGNOSIS — J06.9 UPPER RESPIRATORY TRACT INFECTION, UNSPECIFIED TYPE: Primary | ICD-10-CM

## 2022-07-24 DIAGNOSIS — R07.9 CHEST PAIN: ICD-10-CM

## 2022-07-24 LAB
ALBUMIN SERPL-MCNC: 4.1 G/DL (ref 3.3–5.5)
ALP SERPL-CCNC: 60 U/L (ref 42–141)
BILIRUB SERPL-MCNC: 0.9 MG/DL (ref 0.2–1.6)
BUN SERPL-MCNC: 23 MG/DL (ref 7–22)
CALCIUM SERPL-MCNC: 9.3 MG/DL (ref 8–10.3)
CHLORIDE SERPL-SCNC: 102 MMOL/L (ref 98–108)
CREAT SERPL-MCNC: 1.3 MG/DL (ref 0.6–1.2)
CTP QC/QA: YES
GLUCOSE SERPL-MCNC: 235 MG/DL (ref 73–118)
INFLUENZA A ANTIGEN, POC: NEGATIVE
INFLUENZA B ANTIGEN, POC: NEGATIVE
POC ALT (SGPT): 35 U/L (ref 10–47)
POC AST (SGOT): 34 U/L (ref 11–38)
POC B-TYPE NATRIURETIC PEPTIDE: 10.1 PG/ML (ref 0–100)
POC CARDIAC TROPONIN I: 0.01 NG/ML
POC RAPID STREP A: NEGATIVE
POC TCO2: 27 MMOL/L (ref 18–33)
POCT GLUCOSE: 279 MG/DL (ref 70–110)
POTASSIUM BLD-SCNC: 4.4 MMOL/L (ref 3.6–5.1)
PROTEIN, POC: 8.4 G/DL (ref 6.4–8.1)
SAMPLE: NORMAL
SARS-COV-2 RDRP RESP QL NAA+PROBE: NEGATIVE
SODIUM BLD-SCNC: 137 MMOL/L (ref 128–145)

## 2022-07-24 PROCEDURE — 85025 COMPLETE CBC W/AUTO DIFF WBC: CPT | Mod: ER

## 2022-07-24 PROCEDURE — 93010 ELECTROCARDIOGRAM REPORT: CPT | Mod: ,,, | Performed by: INTERNAL MEDICINE

## 2022-07-24 PROCEDURE — 82962 GLUCOSE BLOOD TEST: CPT | Mod: ER

## 2022-07-24 PROCEDURE — U0002 COVID-19 LAB TEST NON-CDC: HCPCS | Mod: ER | Performed by: NURSE PRACTITIONER

## 2022-07-24 PROCEDURE — 99285 EMERGENCY DEPT VISIT HI MDM: CPT | Mod: 25,ER

## 2022-07-24 PROCEDURE — 80053 COMPREHEN METABOLIC PANEL: CPT | Mod: ER

## 2022-07-24 PROCEDURE — 84484 ASSAY OF TROPONIN QUANT: CPT | Mod: ER

## 2022-07-24 PROCEDURE — 83880 ASSAY OF NATRIURETIC PEPTIDE: CPT | Mod: ER

## 2022-07-24 PROCEDURE — 93005 ELECTROCARDIOGRAM TRACING: CPT | Mod: ER

## 2022-07-24 PROCEDURE — 93010 EKG 12-LEAD: ICD-10-PCS | Mod: ,,, | Performed by: INTERNAL MEDICINE

## 2022-07-24 PROCEDURE — 87804 INFLUENZA ASSAY W/OPTIC: CPT | Mod: 59,ER

## 2022-07-24 RX ORDER — CETIRIZINE HYDROCHLORIDE 10 MG/1
10 TABLET ORAL DAILY
Qty: 30 TABLET | Refills: 0 | Status: SHIPPED | OUTPATIENT
Start: 2022-07-24 | End: 2023-07-24

## 2022-07-24 RX ORDER — FLUTICASONE PROPIONATE 50 MCG
1 SPRAY, SUSPENSION (ML) NASAL 2 TIMES DAILY PRN
Qty: 15 G | Refills: 0 | Status: SHIPPED | OUTPATIENT
Start: 2022-07-24

## 2022-07-24 RX ORDER — ALBUTEROL SULFATE 90 UG/1
2 AEROSOL, METERED RESPIRATORY (INHALATION) EVERY 4 HOURS PRN
Qty: 8 G | Refills: 0 | Status: SHIPPED | OUTPATIENT
Start: 2022-07-24 | End: 2023-07-24

## 2022-07-24 RX ORDER — ACETAMINOPHEN 500 MG
1000 TABLET ORAL
Status: DISCONTINUED | OUTPATIENT
Start: 2022-07-24 | End: 2022-07-24 | Stop reason: HOSPADM

## 2022-07-24 RX ORDER — AMOXICILLIN AND CLAVULANATE POTASSIUM 875; 125 MG/1; MG/1
1 TABLET, FILM COATED ORAL 2 TIMES DAILY
Qty: 14 TABLET | Refills: 0 | Status: SHIPPED | OUTPATIENT
Start: 2022-07-24

## 2022-07-24 RX ORDER — DEXTROMETHORPHAN HYDROBROMIDE, GUAIFENESIN 5; 100 MG/5ML; MG/5ML
650 LIQUID ORAL EVERY 8 HOURS
Qty: 20 TABLET | Refills: 0 | Status: SHIPPED | OUTPATIENT
Start: 2022-07-24

## 2022-07-24 NOTE — ED PROVIDER NOTES
"Encounter Date: 7/24/2022       History     Chief Complaint   Patient presents with    URI     Pt states," I have a cough for one week. I have a headache and I am coughing up stuff."     CC:  URI symptoms    HPI:  This is a 61-year-old female with multiple medical problems including CHF, diabetes, tracheostomy presenting to the ED with 2 day history of cough, nasal congestion, rhinorrhea, otalgia, sore throat.  She also reports midsternal chest pain that is intermittent and described as sharp.  She denies any shortness of breath or extremity swelling.  She has not attempted any treatment prior to arrival.  She reports sick contact at home.    The history is provided by the patient. No  was used.     Review of patient's allergies indicates:   Allergen Reactions    Codeine Itching    Codeine sulfate      Itchy (skin)^, Itchy (skin)^     Past Medical History:   Diagnosis Date    Anemia     Arthritis     Asthma     CHF (congestive heart failure)     Diabetes mellitus     Ear infection     chronic    Glossopharyngeal neuralgia     High cholesterol     Hypertension     Hypothyroid     Lung disease     Migraine headache     Thyroid disease     Tracheostomy in place     Umbilical hernia     Vertigo      Past Surgical History:   Procedure Laterality Date    HERNIA REPAIR      HYSTERECTOMY      Right chronic otitiis media with mucocle      THYROID SURGERY      TRACHEAL SURGERY       Family History   Problem Relation Age of Onset    Hypertension Mother     Hyperlipidemia Mother     Hypertension Brother     Hyperlipidemia Brother     Diabetes Brother      Social History     Tobacco Use    Smoking status: Never Smoker    Smokeless tobacco: Never Used   Substance Use Topics    Alcohol use: Yes     Comment: occasional    Drug use: No     Review of Systems   Constitutional: Positive for chills. Negative for fever.   HENT: Positive for congestion, rhinorrhea and sore throat.  "   Respiratory: Positive for cough. Negative for shortness of breath.    Cardiovascular: Positive for chest pain.   Gastrointestinal: Negative for nausea.   Genitourinary: Negative for dysuria.   Musculoskeletal: Positive for myalgias. Negative for back pain.   Skin: Negative for rash.   Neurological: Negative for weakness.   Hematological: Does not bruise/bleed easily.       Physical Exam     Initial Vitals [07/24/22 0859]   BP Pulse Resp Temp SpO2   (!) 175/97 90 16 97.8 °F (36.6 °C) 99 %      MAP       --         Physical Exam    Constitutional: She appears well-developed and well-nourished. She is not diaphoretic. No distress.   HENT:   Head: Normocephalic and atraumatic.   Right Ear: Hearing, external ear and ear canal normal. Tympanic membrane is erythematous. A middle ear effusion is present.   Left Ear: Hearing, external ear and ear canal normal. Tympanic membrane is erythematous. A middle ear effusion is present.   Nose: Mucosal edema and rhinorrhea present.   Mouth/Throat: Posterior oropharyngeal erythema present. No oropharyngeal exudate.   Eyes: Conjunctivae and EOM are normal. Pupils are equal, round, and reactive to light. Right eye exhibits no discharge. Left eye exhibits no discharge.   Neck: Neck supple.   Tracheostomy in place.   Normal range of motion.  Cardiovascular: Normal rate, regular rhythm, normal heart sounds and intact distal pulses.   Pulmonary/Chest: Breath sounds normal. No respiratory distress.   Abdominal: Abdomen is soft. Bowel sounds are normal. There is no abdominal tenderness. No hernia. There is no rigidity, no rebound, no guarding, no tenderness at McBurney's point and negative Christian's sign.   Musculoskeletal:         General: Normal range of motion.      Cervical back: Normal range of motion and neck supple.     Neurological: She is alert and oriented to person, place, and time.   Skin: Skin is warm and dry.   Psychiatric: She has a normal mood and affect. Her behavior is  normal.         ED Course   Procedures  Labs Reviewed   POCT GLUCOSE - Abnormal; Notable for the following components:       Result Value    POCT Glucose 279 (*)     All other components within normal limits   POCT CMP - Abnormal; Notable for the following components:    POC BUN 23 (*)     POC Creatinine 1.3 (*)     POC Glucose 235 (*)     Protein, POC 8.4 (*)     All other components within normal limits   TROPONIN ISTAT   SARS-COV-2 RDRP GENE    Narrative:     This test utilizes isothermal nucleic acid amplification   technology to detect the SARS-CoV-2 RdRp nucleic acid segment.   The analytical sensitivity (limit of detection) is 125 genome   equivalents/mL.   A POSITIVE result implies infection with the SARS-CoV-2 virus;   the patient is presumed to be contagious.     A NEGATIVE result means that SARS-CoV-2 nucleic acids are not   present above the limit of detection. A NEGATIVE result should be   treated as presumptive. It does not rule out the possibility of   COVID-19 and should not be the sole basis for treatment decisions.   If COVID-19 is strongly suspected based on clinical and exposure   history, re-testing using an alternate molecular assay should be   considered.   This test is only for use under the Food and Drug   Administration s Emergency Use Authorization (EUA).   Commercial kits are provided by [employer].   Performance characteristics of the EUA have been independently   verified by [facility] Department of   Pathology and Laboratory Medicine.   _________________________________________________________________   The authorized Fact Sheet for Healthcare Providers and the authorized Fact   Sheet for Patients of the ID NOW COVID-19 are available on the FDA   website:     https://www.fda.gov/media/541939/download  https://www.fda.gov/media/653482/download          POCT CBC   POCT GLUCOSE MONITORING CONTINUOUS   POCT CMP   POCT TROPONIN   POCT B-TYPE NATRIURETIC PEPTIDE (BNP)    POCT STREP A, RAPID   POCT RAPID INFLUENZA A/B   POCT B-TYPE NATRIURETIC PEPTIDE (BNP)         EKG Readings: (Independently Interpreted)   Initial Reading: No STEMI. Previous EKG: Compared with most recent EKG Previous EKG Date: 11/21/2021. Rhythm: Normal Sinus Rhythm. Heart Rate: 73. Ectopy: No Ectopy. Conduction: 1st Degree AV Block.       Imaging Results          X-Ray Chest PA And Lateral (Final result)  Result time 07/24/22 10:44:06    Final result by Qasim Perales MD (07/24/22 10:44:06)                 Impression:      Cardiac silhouette is enlarged, increased in size when compared to prior exam.      Electronically signed by: Qasim Perales MD  Date:    07/24/2022  Time:    10:44             Narrative:    EXAMINATION:  XR CHEST PA AND LATERAL    CLINICAL HISTORY:  Chest pain, unspecified    TECHNIQUE:  PA and lateral views of the chest were performed.    COMPARISON:  Multiple prior exams, most recent from 11/21/2021    FINDINGS:  Cardiac silhouette is enlarged, increased in size when compared to prior exam.  Stable tracheostomy.  Lungs are clear.  No pleural effusion.  No pneumothorax.  No evidence of acute fracture.                                 Medications - No data to display  Medical Decision Making:   ED Management:  This is an evaluation of a 61 y.o. female that presents to the Emergency Department for URI symptoms.  Also reports chest pain. The patient is a non-toxic, afebrile, and well appearing female. On physical exam TMs are erythematous. Appears well hydrated with moist mucus membranes. Neck soft and supple with no meningeal signs or cervical lymphadenopathy. Breath sounds are clear and equal bilaterally with no adventitious breath sounds, tachypnea or respiratory distress with room air pulse ox of 96% and no evidence of hypoxia.     Vital Signs Are Reassuring.  RESULTS:   COVID and flu were negative.  EKG with normal sinus rhythm without any acute ischemic changes.  Troponin is  negative.  Chest x-ray without consolidation concerning for pneumonia.  No pleural effusion.  No pneumothorax.  Cardiac silhouette is enlarged, increased in size when compared to prior exam.  However, BNP within normal limits.  No extremity swelling on exam.  I do not suspect acute CHF.  I do not suspect acute cardiopulmonary etiology of symptoms.  CBC and CMP reassuring.      Suspect viral illness and acute otitis media.  Will treat with Augmentin and symptomatically.    I considered, but at this time, do not suspect ACS, acute CHF, OE, strep pharyngitis, meningitis, pneumonia, or acute bacterial sinusitis.    The diagnosis, treatment plan, instructions for follow-up and reevaluation with PCP as well as ED return precautions were discussed and understanding was verbalized. All questions or concerns have been addressed.                         Clinical Impression:   Final diagnoses:  [R07.9] Chest pain  [J06.9] Upper respiratory tract infection, unspecified type (Primary)  [H66.93] Bilateral otitis media, unspecified otitis media type          ED Disposition Condition    Discharge Stable        ED Prescriptions     Medication Sig Dispense Start Date End Date Auth. Provider    acetaminophen (TYLENOL) 650 MG TbSR Take 1 tablet (650 mg total) by mouth every 8 (eight) hours. 20 tablet 7/24/2022  Mily Figueroa NP    albuterol (PROVENTIL/VENTOLIN HFA) 90 mcg/actuation inhaler Inhale 2 puffs into the lungs every 4 (four) hours as needed for Wheezing. Rescue  Use with spacer  DiSpence with one spacer 8 g 7/24/2022 7/24/2023 Mily Figueroa NP    cetirizine (ZYRTEC) 10 MG tablet Take 1 tablet (10 mg total) by mouth once daily. 30 tablet 7/24/2022 7/24/2023 Mily Figueroa NP    fluticasone propionate (FLONASE) 50 mcg/actuation nasal spray 1 spray (50 mcg total) by Each Nostril route 2 (two) times daily as needed for Rhinitis or Allergies. 15 g 7/24/2022  Mily Figueroa NP    amoxicillin-clavulanate 875-125mg  (AUGMENTIN) 875-125 mg per tablet Take 1 tablet by mouth 2 (two) times daily. 14 tablet 7/24/2022  Mily Figueroa NP        Follow-up Information     Follow up With Specialties Details Why Contact Info    Maria Victoria Tovar MD Family Medicine Schedule an appointment as soon as possible for a visit  For follow-up [address]  [place] [address]  [phone]      [facility] ED Emergency Medicine Go to  If symptoms worsen [address] [facility] [id number] [phone]           Mily Figueroa NP  07/24/22 2036

## 2022-07-24 NOTE — DISCHARGE INSTRUCTIONS
Please return to the Emergency Department for any new or worsening symptoms including: fever, chest pain, shortness of breath, loss of consciousness, dizziness, weakness, or any other concerns.     Please follow up with your Primary Care Provider within the week. If you do not have one, you may contact the one listed on your discharge paperwork or you may also call the [facility] Appointment Desk at [phone] to schedule an appointment with one.     Please take all medication as prescribed.     no back pain

## 2022-09-15 ENCOUNTER — TELEPHONE (OUTPATIENT)
Dept: ENDOSCOPY | Facility: HOSPITAL | Age: 62
End: 2022-09-15
Payer: MEDICAID

## 2022-09-15 NOTE — TELEPHONE ENCOUNTER
Good morning,     Patient had an order for a Colonoscopy placed by you from a referral for Ayo from [facility] on 12/3/2021.Patient had PCI on 3/8/2022 and has to be on dual antiplatelet therapy (Plavix and aspirin) for one year. Also she is not cleared by her cardiologist at this time to proceed with colonoscopy. See media tab 9/15/22 for Cardiac clearance and Plavix hold request .     Thanks,    DIANNA Betancur

## 2022-09-15 NOTE — TELEPHONE ENCOUNTER
Request for cardiac clearance and Plavix hold faxed to Dr. Benedict Hidalgo at [facility]. Patient had PCI on 3/8/2022 and has to be on dual antiplatelet therapy (Plavix and aspirin) for one year. Also she is not cleared by her cardiologist at this time to proceed with colonoscopy. See media tab 9/15/22 for Cardiac clearance  and Plavix hold request.

## 2022-09-16 ENCOUNTER — TELEPHONE (OUTPATIENT)
Dept: ENDOSCOPY | Facility: HOSPITAL | Age: 62
End: 2022-09-16
Payer: MEDICAID

## 2022-09-16 NOTE — TELEPHONE ENCOUNTER
Edie Terrazas MD  to Me      8:00 AM   Ok thank you   September 15, 2022         9:06 AM  You routed this conversation to Edie Terrazas MD           8:54 AM  You routed this conversation to Edie Terrazas MD    Me        8:54 AM  Note  Good morning,      Patient had an order for a Colonoscopy placed by you from a referral for [facility] from [facility] on 12/3/2021.Patient had PCI on 3/8/2022 and has to be on dual antiplatelet therapy (Plavix and aspirin) for one year. Also she is not cleared by her cardiologist at this time to proceed with colonoscopy. See media tab 9/15/22 for Cardiac clearance and Plavix hold request .      Thanks,     DIANNA Betancur

## 2022-12-14 ENCOUNTER — HOSPITAL ENCOUNTER (EMERGENCY)
Facility: HOSPITAL | Age: 62
Discharge: HOME | End: 2022-12-14
Attending: EMERGENCY MEDICINE
Payer: MEDICAID

## 2022-12-14 VITALS
SYSTOLIC BLOOD PRESSURE: 135 MMHG | DIASTOLIC BLOOD PRESSURE: 65 MMHG | BODY MASS INDEX: 42.62 KG/M2 | OXYGEN SATURATION: 98 % | WEIGHT: 233 LBS | RESPIRATION RATE: 18 BRPM | HEART RATE: 85 BPM | TEMPERATURE: 98 F

## 2022-12-14 DIAGNOSIS — M25.531 WRIST PAIN, ACUTE, RIGHT: ICD-10-CM

## 2022-12-14 DIAGNOSIS — M25.531 ARTHRALGIA OF RIGHT WRIST: Primary | ICD-10-CM

## 2022-12-14 PROCEDURE — 29125 APPL SHORT ARM SPLINT STATIC: CPT | Mod: RT,ER

## 2022-12-14 PROCEDURE — 99283 EMERGENCY DEPT VISIT LOW MDM: CPT | Mod: 25,ER

## 2022-12-14 RX ORDER — DICLOFENAC SODIUM 75 MG/1
75 TABLET, DELAYED RELEASE ORAL 2 TIMES DAILY
Qty: 14 TABLET | Refills: 0 | Status: SHIPPED | OUTPATIENT
Start: 2022-12-14 | End: 2022-12-21

## 2022-12-14 NOTE — DISCHARGE INSTRUCTIONS

## 2022-12-14 NOTE — ED TRIAGE NOTES
Irasema Valverde, a 62 y.o. female presents to the ED via PV with CC of R hand and wrist pain x 2days. Denies injury or trauma. Pt has trach collar in place 99% O2 sats on room air

## 2022-12-14 NOTE — ED PROVIDER NOTES
Encounter Date: 12/14/2022    SCRIBE #1 NOTE: I, Juli Roper, am scribing for, and in the presence of,  Delfina Nieto MD. I have scribed the following portions of the note - Other sections scribed: HPI, ROS, PE.     History     Chief Complaint   Patient presents with    Joint Pain     Pt states non traumatic right arm pain from elbow to wrist  pt states pain began yesterday      62 y.o. female with PMHx of Arthritis, CHF, DM, HLD, HTM, and others who was brought to the ED by a friend for chief complaint of right forearm and hand pain for 2 days. Patient denies attempted treatment. She reports pain with movement of extremity but is presently moving the extremity. No other exacerbating or alleviating factors stated. Patient is a poor historian.    The history is provided by the patient. No  was used.   Review of patient's allergies indicates:   Allergen Reactions    Codeine Itching    Codeine sulfate      Itchy (skin)^, Itchy (skin)^     Past Medical History:   Diagnosis Date    Anemia     Arthritis     Asthma     CHF (congestive heart failure)     Diabetes mellitus     Ear infection     chronic    Glossopharyngeal neuralgia     High cholesterol     Hypertension     Hypothyroid     Lung disease     Migraine headache     Thyroid disease     Tracheostomy in place     Umbilical hernia     Vertigo      Past Surgical History:   Procedure Laterality Date    HERNIA REPAIR      HYSTERECTOMY      Right chronic otitiis media with mucocle      THYROID SURGERY      TRACHEAL SURGERY       Family History   Problem Relation Age of Onset    Hypertension Mother     Hyperlipidemia Mother     Hypertension Brother     Hyperlipidemia Brother     Diabetes Brother      Social History     Tobacco Use    Smoking status: Never    Smokeless tobacco: Never   Substance Use Topics    Alcohol use: Yes     Comment: occasional    Drug use: No     Review of Systems   Constitutional:  Negative for chills and fever.   HENT:  Negative  for congestion and sore throat.    Eyes:  Negative for pain.   Respiratory:  Negative for shortness of breath and wheezing.    Cardiovascular:  Negative for chest pain.   Gastrointestinal:  Negative for abdominal pain.   Genitourinary:  Negative for flank pain.   Musculoskeletal:  Positive for myalgias.   Skin:  Negative for color change.   Neurological:  Negative for weakness and headaches.   Hematological:  Does not bruise/bleed easily.   Psychiatric/Behavioral:  Negative for suicidal ideas.    All other systems reviewed and are negative.    Physical Exam     Initial Vitals [12/14/22 0916]   BP Pulse Resp Temp SpO2   (!) 129/92 81 18 98.5 °F (36.9 °C) 100 %      MAP       --         Physical Exam    Nursing note and vitals reviewed.  Constitutional: She appears well-developed.   HENT:   Head: Normocephalic.   Mouth/Throat: Oropharynx is clear and moist.   Eyes: Conjunctivae are normal.   Neck: A tracheostomy is present.   Normal range of motion.  Cardiovascular:  Normal rate, regular rhythm and normal heart sounds.     Exam reveals no decreased pulses.       No murmur heard.  Pulmonary/Chest: Breath sounds normal. No respiratory distress.   Abdominal: Abdomen is soft. She exhibits no distension.   Musculoskeletal:         General: Normal range of motion.      Cervical back: Normal range of motion.      Comments: Patient endorses pain to distal R forearm, wrist, and hand. Extremity is nontraumatic and not edematous. Patient is gripping and releasing hand rapidly on exam. When distracted, she relaxes hand.     Neurological: She is alert.   Skin: Skin is warm and dry.   Psychiatric: She has a normal mood and affect.       ED Course   Procedures  Labs Reviewed - No data to display       Imaging Results              X-Ray Wrist Complete Right (Final result)  Result time 12/14/22 10:56:42      Final result by Luis Martinez MD (12/14/22 10:56:42)                   Impression:      See above      Electronically signed  by: Luis Martinez MD  Date:    12/14/2022  Time:    10:56               Narrative:    EXAMINATION:  XR WRIST COMPLETE 3 VIEWS RIGHT    CLINICAL HISTORY:  Pain in right wrist    TECHNIQUE:  PA, lateral, and oblique views of the right wrist were performed.    COMPARISON:  None    FINDINGS:  No fracture or dislocation.  No bone destruction identified                                       X-Ray Hand 3 view Right (Final result)  Result time 12/14/22 10:58:44      Final result by Luis Martinez MD (12/14/22 10:58:44)                   Impression:      See above      Electronically signed by: Luis Martinez MD  Date:    12/14/2022  Time:    10:58               Narrative:    EXAMINATION:  XR HAND COMPLETE 3 VIEW RIGHT    CLINICAL HISTORY:  hand pain;    TECHNIQUE:  PA, lateral, and oblique views of the right hand were performed.    COMPARISON:  None    FINDINGS:  No fracture or dislocation.  No bone destruction identified                                       Medications - No data to display  Medical Decision Making:   History:   Old Medical Records: I decided to obtain old medical records.  Initial Assessment:   This is an emergent evaluation of a 62-year-old who presented to the ED with a complaint of right wrist and hand pain.   Differential Diagnosis:   Arthritis, septic joint, fracture, amongst others.  Clinical Tests:   Radiological Study: Reviewed and Ordered  ED Management:  On physical examination, patient was in no acute distress.  Heart and lung sounds were within normal limits.  She had a tracheostomy.  Examination of the limb revealed no obvious signs of trauma.  She endorsed tenderness over the distal wrist and hand however had good  strength, full range of motion without eliciting pain, no erythema, and no warmth.  I have ordered x-rays of the wrist and the hand at this time.  Disposition is pending.    Delfina Nieto MD  10:26 AM  12/14/2022     Xrays negative for acute findings. Patient placed in a velcro  wrist splint for comfort and advised close f/u w/PCP this week. She stated she will be able to do so. Return precautions given.     Delfina Nieto MD  1147 AM  12/14/2022           Scribe Attestation:   Scribe #1: I performed the above scribed service and the documentation accurately describes the services I performed. I attest to the accuracy of the note.                 I, Delfina Nieto MD, personally performed the services described in the documentation.  All medical records entries made by the scribe were made at my direction and in my presence.  I have reviewed the chart and agree that the record reflects my personal performance and is accurate and complete.   Clinical Impression:   Final diagnoses:  [M25.531] Wrist pain, acute, right  [M25.531] Arthralgia of right wrist (Primary)        ED Disposition Condition    Discharge Stable          ED Prescriptions       Medication Sig Dispense Start Date End Date Auth. Provider    diclofenac (VOLTAREN) 75 MG EC tablet Take 1 tablet (75 mg total) by mouth 2 (two) times daily. for 7 days 14 tablet 12/14/2022 12/21/2022 Delfina Nieto MD          Follow-up Information       Follow up With Specialties Details Why Contact Info    Maria Victoria Tovar MD Family Medicine Schedule an appointment as soon as possible for a visit in 3 days  [address] [address]  [phone]               Delfina Nieto MD  12/20/22 9614

## 2023-07-11 ENCOUNTER — HOSPITAL ENCOUNTER (EMERGENCY)
Facility: HOSPITAL | Age: 63
Discharge: HOME | End: 2023-07-11
Attending: EMERGENCY MEDICINE
Payer: MEDICAID

## 2023-07-11 VITALS
WEIGHT: 280 LBS | BODY MASS INDEX: 51.53 KG/M2 | HEIGHT: 62 IN | HEART RATE: 74 BPM | OXYGEN SATURATION: 97 % | DIASTOLIC BLOOD PRESSURE: 72 MMHG | RESPIRATION RATE: 14 BRPM | TEMPERATURE: 98 F | SYSTOLIC BLOOD PRESSURE: 164 MMHG

## 2023-07-11 DIAGNOSIS — R06.02 SOB (SHORTNESS OF BREATH): ICD-10-CM

## 2023-07-11 DIAGNOSIS — R07.9 CHEST PAIN: Primary | ICD-10-CM

## 2023-07-11 DIAGNOSIS — E11.65 HYPERGLYCEMIA DUE TO DIABETES MELLITUS: ICD-10-CM

## 2023-07-11 LAB
ALBUMIN SERPL-MCNC: 3.9 G/DL (ref 3.3–5.5)
ALP SERPL-CCNC: 73 U/L (ref 42–141)
BILIRUB SERPL-MCNC: 0.7 MG/DL (ref 0.2–1.6)
BILIRUBIN, POC UA: NEGATIVE
BLOOD, POC UA: ABNORMAL
BUN SERPL-MCNC: 15 MG/DL (ref 7–22)
CALCIUM SERPL-MCNC: 9.6 MG/DL (ref 8–10.3)
CHLORIDE SERPL-SCNC: 98 MMOL/L (ref 98–108)
CLARITY, POC UA: CLEAR
COLOR, POC UA: YELLOW
CREAT SERPL-MCNC: 1.1 MG/DL (ref 0.6–1.2)
CTP QC/QA: YES
GLUCOSE SERPL-MCNC: 384 MG/DL (ref 73–118)
GLUCOSE, POC UA: ABNORMAL
INFLUENZA A ANTIGEN, POC: NEGATIVE
INFLUENZA B ANTIGEN, POC: NEGATIVE
KETONES, POC UA: NEGATIVE
LEUKOCYTE EST, POC UA: NEGATIVE
NITRITE, POC UA: NEGATIVE
PH UR STRIP: 5.5 [PH]
POC ALT (SGPT): 28 U/L (ref 10–47)
POC AST (SGOT): 24 U/L (ref 11–38)
POC B-TYPE NATRIURETIC PEPTIDE: 35.8 PG/ML (ref 0–100)
POC CARDIAC TROPONIN I: 0.01 NG/ML (ref 0–0.08)
POC CARDIAC TROPONIN I: 0.03 NG/ML (ref 0–0.08)
POC TCO2: 28 MMOL/L (ref 18–33)
POCT GLUCOSE: 298 MG/DL (ref 70–110)
POCT GLUCOSE: 401 MG/DL (ref 70–110)
POTASSIUM BLD-SCNC: 4.9 MMOL/L (ref 3.6–5.1)
PROTEIN, POC UA: ABNORMAL
PROTEIN, POC: 8.1 G/DL (ref 6.4–8.1)
SAMPLE: NORMAL
SAMPLE: NORMAL
SARS-COV-2 RDRP RESP QL NAA+PROBE: NEGATIVE
SODIUM BLD-SCNC: 141 MMOL/L (ref 128–145)
SPECIFIC GRAVITY, POC UA: 1.02
UROBILINOGEN, POC UA: 0.2 E.U./DL

## 2023-07-11 PROCEDURE — 82962 GLUCOSE BLOOD TEST: CPT | Mod: ER

## 2023-07-11 PROCEDURE — 85025 COMPLETE CBC W/AUTO DIFF WBC: CPT | Mod: ER

## 2023-07-11 PROCEDURE — 93010 ELECTROCARDIOGRAM REPORT: CPT | Mod: ,,, | Performed by: INTERNAL MEDICINE

## 2023-07-11 PROCEDURE — 96375 TX/PRO/DX INJ NEW DRUG ADDON: CPT | Mod: ER

## 2023-07-11 PROCEDURE — 93010 EKG 12-LEAD: ICD-10-PCS | Mod: ,,, | Performed by: INTERNAL MEDICINE

## 2023-07-11 PROCEDURE — 83880 ASSAY OF NATRIURETIC PEPTIDE: CPT | Mod: ER

## 2023-07-11 PROCEDURE — 87635 SARS-COV-2 COVID-19 AMP PRB: CPT | Mod: ER | Performed by: EMERGENCY MEDICINE

## 2023-07-11 PROCEDURE — 84484 ASSAY OF TROPONIN QUANT: CPT | Mod: ER

## 2023-07-11 PROCEDURE — 63600175 PHARM REV CODE 636 W HCPCS: Mod: ER | Performed by: EMERGENCY MEDICINE

## 2023-07-11 PROCEDURE — 25000003 PHARM REV CODE 250: Mod: ER | Performed by: EMERGENCY MEDICINE

## 2023-07-11 PROCEDURE — 87804 INFLUENZA ASSAY W/OPTIC: CPT | Mod: ER

## 2023-07-11 PROCEDURE — 96374 THER/PROPH/DIAG INJ IV PUSH: CPT | Mod: ER

## 2023-07-11 PROCEDURE — 96361 HYDRATE IV INFUSION ADD-ON: CPT | Mod: ER

## 2023-07-11 PROCEDURE — 93005 ELECTROCARDIOGRAM TRACING: CPT | Mod: ER

## 2023-07-11 PROCEDURE — 81003 URINALYSIS AUTO W/O SCOPE: CPT | Mod: ER

## 2023-07-11 PROCEDURE — 99284 EMERGENCY DEPT VISIT MOD MDM: CPT | Mod: 25,ER

## 2023-07-11 PROCEDURE — 85610 PROTHROMBIN TIME: CPT | Mod: ER

## 2023-07-11 PROCEDURE — 80053 COMPREHEN METABOLIC PANEL: CPT | Mod: ER

## 2023-07-11 RX ORDER — KETOROLAC TROMETHAMINE 30 MG/ML
15 INJECTION, SOLUTION INTRAMUSCULAR; INTRAVENOUS
Status: COMPLETED | OUTPATIENT
Start: 2023-07-11 | End: 2023-07-11

## 2023-07-11 RX ADMIN — SODIUM CHLORIDE 1000 ML: 9 INJECTION, SOLUTION INTRAVENOUS at 10:07

## 2023-07-11 RX ADMIN — KETOROLAC TROMETHAMINE 15 MG: 30 INJECTION, SOLUTION INTRAMUSCULAR; INTRAVENOUS at 11:07

## 2023-07-11 RX ADMIN — INSULIN HUMAN 4 UNITS: 100 INJECTION, SOLUTION PARENTERAL at 02:07

## 2023-07-11 NOTE — DISCHARGE INSTRUCTIONS
Your sugar is high. Take all of your medications as directed. Take motrin or tylenol for pain. Drink more water. Follow up with your doctor if your symptoms continue.

## 2023-07-11 NOTE — ED PROVIDER NOTES
"Encounter Date: 7/11/2023    SCRIBE #1 NOTE: I, Alize Samuel, am scribing for, and in the presence of,  Dilcia Cleveland MD.. I have scribed the following portions of the note - Other sections scribed: HPI; ROS; PE.     History     Chief Complaint   Patient presents with    Post-op Problem     Pt states right side body pain x1 month since shes had tubes place in ears ? Pt has multiple other complaints      Irasema Valverde is a 62 y.o. female with Hx of CHF, DM, HLD, and HTN who presents to the ED for chief complaint of chest pain, shortness of breath with exertion, and right sided myalgias. Patient reports "it feels like I lose my balance" with ambulating. She attempted treatment with tylenol with no immediate relief. No further complaints at this time. Patient is allergic to codeine.       The history is provided by the patient. No  was used.   Review of patient's allergies indicates:   Allergen Reactions    Codeine Itching    Codeine sulfate      Itchy (skin)^, Itchy (skin)^     Past Medical History:   Diagnosis Date    Anemia     Arthritis     Asthma     CHF (congestive heart failure)     Diabetes mellitus     Ear infection     chronic    Glossopharyngeal neuralgia     High cholesterol     Hypertension     Hypothyroid     Lung disease     Migraine headache     Thyroid disease     Tracheostomy in place     Umbilical hernia     Vertigo      Past Surgical History:   Procedure Laterality Date    HERNIA REPAIR      HYSTERECTOMY      Right chronic otitiis media with mucocle      THYROID SURGERY      TRACHEAL SURGERY       Family History   Problem Relation Age of Onset    Hypertension Mother     Hyperlipidemia Mother     Hypertension Brother     Hyperlipidemia Brother     Diabetes Brother      Social History     Tobacco Use    Smoking status: Never    Smokeless tobacco: Never   Substance Use Topics    Alcohol use: Yes     Comment: occasional    Drug use: No     Review of Systems   Constitutional:  " Negative for activity change, appetite change, chills and fever.   HENT:  Negative for congestion, rhinorrhea, sneezing and sore throat.    Respiratory:  Positive for shortness of breath. Negative for cough, choking and wheezing.    Cardiovascular:  Positive for chest pain. Negative for palpitations.   Gastrointestinal:  Negative for abdominal pain, diarrhea, nausea and vomiting.   Musculoskeletal:  Positive for myalgias.   Neurological:  Negative for dizziness, syncope, light-headedness and headaches.   All other systems reviewed and are negative.    Physical Exam     Initial Vitals [07/11/23 0947]   BP Pulse Resp Temp SpO2   (!) 151/88 81 20 98 °F (36.7 °C) 98 %      MAP       --         Physical Exam    Nursing note and vitals reviewed.  Constitutional: Vital signs are normal. She appears well-developed and well-nourished. She is cooperative. She does not appear ill. No distress.   HENT:   Head: Normocephalic and atraumatic.   Mouth/Throat: Uvula is midline and mucous membranes are normal.   Eyes: Conjunctivae and lids are normal.   Neck: Neck supple.   Normal range of motion.  Cardiovascular:  Normal rate, regular rhythm, S1 normal, S2 normal and normal heart sounds.           No murmur heard.  Pulmonary/Chest: Effort normal and breath sounds normal. No respiratory distress. She has no wheezes.   Trach collar in place.    Abdominal: Abdomen is soft. Bowel sounds are normal. She exhibits no distension. There is no abdominal tenderness.   Musculoskeletal:         General: No tenderness.      Cervical back: Normal range of motion and neck supple.      Right lower leg: Edema present.      Left lower leg: Edema present.     Neurological: She is alert and oriented to person, place, and time.   Skin: Skin is warm, dry and intact.   Psychiatric: She has a normal mood and affect. Her speech is normal and behavior is normal.       ED Course   Procedures  Labs Reviewed   POCT URINALYSIS W/O SCOPE - Abnormal; Notable for  the following components:       Result Value    Glucose, UA 3+ (*)     Blood, UA Trace-intact (*)     Protein, UA 2+ (*)     All other components within normal limits   POCT GLUCOSE - Abnormal; Notable for the following components:    POCT Glucose 401 (*)     All other components within normal limits   POCT CMP - Abnormal; Notable for the following components:    POC Glucose 384 (*)     All other components within normal limits   POCT GLUCOSE - Abnormal; Notable for the following components:    POCT Glucose 298 (*)     All other components within normal limits   TROPONIN ISTAT   TROPONIN ISTAT   POCT URINALYSIS W/O SCOPE   POCT CBC   SARS-COV-2 RDRP GENE    Narrative:     This test utilizes isothermal nucleic acid amplification technology to detect the SARS-CoV-2 RdRp nucleic acid segment. The analytical sensitivity (limit of detection) is 500 copies/swab.     A POSITIVE result is indicative of the presence of SARS-CoV-2 RNA; clinical correlation with patient history and other diagnostic information is necessary to determine patient infection status.    A NEGATIVE result means that SARS-CoV-2 nucleic acids are not present above the limit of detection. A NEGATIVE result should be treated as presumptive. It does not rule out the possibility of COVID-19 and should not be the sole basis for treatment decisions. If COVID-19 is strongly suspected based on clinical and exposure history, re-testing using an alternate molecular assay should be considered.     This test is only for use under the Food and Drug Administration s Emergency Use Authorization (EUA).     Commercial kits are provided by [employer]. Performance characteristics of the EUA have been independently verified by [facility] Department of Pathology and Laboratory Medicine.   _________________________________________________________________   The authorized Fact Sheet for Healthcare Providers and the authorized Fact Sheet for Patients of  the ID NOW COVID-19 are available on the FDA website:    https://www.fda.gov/media/545527/download      https://www.fda.gov/media/394475/download      POCT CMP   POCT PROTIME-INR   POCT TROPONIN   POCT B-TYPE NATRIURETIC PEPTIDE (BNP)   POCT RAPID INFLUENZA A/B   POCT B-TYPE NATRIURETIC PEPTIDE (BNP)   POCT TROPONIN     EKG Readings: (Independently Interpreted)   Initial Reading: No STEMI. Rhythm: Normal Sinus Rhythm. Heart Rate: 73. Ectopy: No Ectopy. Conduction: Normal. ST Segments: Normal ST Segments. T Waves: Normal. T Waves Flipped: III and AVF. Clinical Impression: Normal Sinus Rhythm Other Impression: with LVH. Independently interpreted by me.     Imaging Results              X-Ray Chest PA And Lateral (Final result)  Result time 07/11/23 11:28:08      Final result by Chidi Woo MD (07/11/23 11:28:08)                   Impression:      1. Stable enlargement of the cardiac silhouette.  2. Tracheostomy tube.      Electronically signed by: Chidi Woo MD  Date:    07/11/2023  Time:    11:28               Narrative:    EXAMINATION:  XR CHEST PA AND LATERAL    CLINICAL HISTORY:  Chest Pain;    TECHNIQUE:  PA and lateral views of the chest were performed.    COMPARISON:  Radiograph 07/24/2022.    FINDINGS:  Tracheostomy tube projects over the tracheal stripe with tip at the level of the clavicular heads.  Mediastinal structures are midline.  Hilar contours are unremarkable.  Cardiac silhouette remains enlarged.  Coronary artery stents.  Lung volumes are normal and symmetric.  No consolidation.  No pneumothorax or pleural effusion.  No acute osseous abnormalities.  Atherosclerotic calcification of the visualized abdominal aorta.  Visualized soft tissues are within normal limits.                                       Medications   sodium chloride 0.9% bolus 1,000 mL 1,000 mL (0 mLs Intravenous Stopped 7/11/23 1201)   ketorolac injection 15 mg (15 mg Intravenous Given 7/11/23 1154)   insulin regular  injection 4 Units 0.04 mL (4 Units Intravenous Given 7/11/23 1411)           Medical Decision Making  62 y.o. female with Hx of CHF, DM, HLD, and HTN presents with chest pain, shortness of breath with exertion, and right sided myalgias. On exam, trach collar in place, bilateral lower extremity edema.     In shared decision making with the patient, we discussed my plan to order labs, EKG, and X-Ray. Work up shows hyperglycemia, no DKA or dehydration. She was given IVFs. I considered but excluded STEMI, acute MI, arrhythmia, pneumonia, pneumothorax, CHF exacerbation, influenza and covid. Return precautions discussed.       Amount and/or Complexity of Data Reviewed  External Data Reviewed: ECG and notes.  Labs: ordered.  Radiology: ordered.  ECG/medicine tests: ordered.           Scribe Attestation:   Scribe #1: I performed the above scribed service and the documentation accurately describes the services I performed. I attest to the accuracy of the note.            I, Dr. Dilcia Cleveland, personally performed the services described in this documentation.   All medical record entries made by the scribe were at my direction and in my presence.   I have reviewed the chart and agree that the record is accurate and complete.   Dilcia Cleveland MD.  10:20 AM 07/11/2023          Clinical Impression:   Final diagnoses:  [R07.9] Chest pain (Primary)  [R06.02] SOB (shortness of breath)  [E11.65] Hyperglycemia due to diabetes mellitus        ED Disposition Condition    Discharge Stable          ED Prescriptions    None       Follow-up Information       Follow up With Specialties Details Why Contact Info    Maria Victoria Tovar MD Family Medicine  As needed [address] [id number] [phone]               Dilcia Cleveland MD  07/11/23 9013